# Patient Record
Sex: MALE | Race: WHITE | NOT HISPANIC OR LATINO | Employment: OTHER | URBAN - METROPOLITAN AREA
[De-identification: names, ages, dates, MRNs, and addresses within clinical notes are randomized per-mention and may not be internally consistent; named-entity substitution may affect disease eponyms.]

---

## 2019-03-25 DIAGNOSIS — I48.0 PAROXYSMAL ATRIAL FIBRILLATION: Primary | ICD-10-CM

## 2019-03-26 ENCOUNTER — OFFICE VISIT (OUTPATIENT)
Dept: CARDIOLOGY | Facility: CLINIC | Age: 49
End: 2019-03-26
Payer: MEDICARE

## 2019-03-26 ENCOUNTER — CLINICAL SUPPORT (OUTPATIENT)
Dept: CARDIOLOGY | Facility: CLINIC | Age: 49
End: 2019-03-26
Payer: MEDICARE

## 2019-03-26 VITALS
BODY MASS INDEX: 35.16 KG/M2 | DIASTOLIC BLOOD PRESSURE: 75 MMHG | HEART RATE: 75 BPM | WEIGHT: 251.13 LBS | SYSTOLIC BLOOD PRESSURE: 153 MMHG | HEIGHT: 71 IN

## 2019-03-26 DIAGNOSIS — I10 ESSENTIAL HYPERTENSION: Primary | ICD-10-CM

## 2019-03-26 DIAGNOSIS — R06.02 SOB (SHORTNESS OF BREATH): ICD-10-CM

## 2019-03-26 DIAGNOSIS — M79.605 CHRONIC PAIN OF BOTH LOWER EXTREMITIES: ICD-10-CM

## 2019-03-26 DIAGNOSIS — M79.604 CHRONIC PAIN OF BOTH LOWER EXTREMITIES: ICD-10-CM

## 2019-03-26 DIAGNOSIS — I48.0 PAROXYSMAL ATRIAL FIBRILLATION: ICD-10-CM

## 2019-03-26 DIAGNOSIS — F17.200 SMOKER: ICD-10-CM

## 2019-03-26 DIAGNOSIS — G89.29 CHRONIC PAIN OF BOTH LOWER EXTREMITIES: ICD-10-CM

## 2019-03-26 DIAGNOSIS — I10 ESSENTIAL HYPERTENSION: ICD-10-CM

## 2019-03-26 PROCEDURE — 93000 EKG 12-LEAD: ICD-10-PCS | Mod: S$GLB,,, | Performed by: INTERNAL MEDICINE

## 2019-03-26 PROCEDURE — 99999 PR PBB SHADOW E&M-EST. PATIENT-LVL III: ICD-10-PCS | Mod: PBBFAC,,, | Performed by: INTERNAL MEDICINE

## 2019-03-26 PROCEDURE — 3077F SYST BP >= 140 MM HG: CPT | Mod: CPTII,S$GLB,, | Performed by: INTERNAL MEDICINE

## 2019-03-26 PROCEDURE — 93000 ELECTROCARDIOGRAM COMPLETE: CPT | Mod: S$GLB,,, | Performed by: INTERNAL MEDICINE

## 2019-03-26 PROCEDURE — 99205 PR OFFICE/OUTPT VISIT, NEW, LEVL V, 60-74 MIN: ICD-10-PCS | Mod: S$GLB,,, | Performed by: INTERNAL MEDICINE

## 2019-03-26 PROCEDURE — 3008F BODY MASS INDEX DOCD: CPT | Mod: CPTII,S$GLB,, | Performed by: INTERNAL MEDICINE

## 2019-03-26 PROCEDURE — 3008F PR BODY MASS INDEX (BMI) DOCUMENTED: ICD-10-PCS | Mod: CPTII,S$GLB,, | Performed by: INTERNAL MEDICINE

## 2019-03-26 PROCEDURE — 3077F PR MOST RECENT SYSTOLIC BLOOD PRESSURE >= 140 MM HG: ICD-10-PCS | Mod: CPTII,S$GLB,, | Performed by: INTERNAL MEDICINE

## 2019-03-26 PROCEDURE — 3078F PR MOST RECENT DIASTOLIC BLOOD PRESSURE < 80 MM HG: ICD-10-PCS | Mod: CPTII,S$GLB,, | Performed by: INTERNAL MEDICINE

## 2019-03-26 PROCEDURE — 99999 PR PBB SHADOW E&M-EST. PATIENT-LVL III: CPT | Mod: PBBFAC,,, | Performed by: INTERNAL MEDICINE

## 2019-03-26 PROCEDURE — 3078F DIAST BP <80 MM HG: CPT | Mod: CPTII,S$GLB,, | Performed by: INTERNAL MEDICINE

## 2019-03-26 PROCEDURE — 99205 OFFICE O/P NEW HI 60 MIN: CPT | Mod: S$GLB,,, | Performed by: INTERNAL MEDICINE

## 2019-03-26 RX ORDER — ASPIRIN 81 MG/1
81 TABLET ORAL DAILY
COMMUNITY
Start: 2019-01-16 | End: 2020-06-12

## 2019-03-26 RX ORDER — MELOXICAM 15 MG/1
15 TABLET ORAL DAILY
COMMUNITY
End: 2022-07-14

## 2019-03-26 RX ORDER — LABETALOL 300 MG/1
300 TABLET, FILM COATED ORAL EVERY 12 HOURS
COMMUNITY
End: 2022-08-19 | Stop reason: ALTCHOICE

## 2019-03-26 RX ORDER — AMLODIPINE BESYLATE 10 MG/1
10 TABLET ORAL DAILY
COMMUNITY

## 2019-03-26 RX ORDER — SPIRONOLACTONE 25 MG/1
25 TABLET ORAL DAILY
COMMUNITY
End: 2022-07-14

## 2019-03-26 RX ORDER — GABAPENTIN 600 MG/1
600 TABLET ORAL 3 TIMES DAILY
COMMUNITY
End: 2022-07-14

## 2019-03-26 RX ORDER — FAMOTIDINE 40 MG/1
40 TABLET, FILM COATED ORAL DAILY
COMMUNITY

## 2019-03-26 NOTE — PROGRESS NOTES
Subjective:   Patient ID:  Carson Rivers is a 49 y.o. male who presents for follow-up of Consult  Pt with chronic SOB waxing and waning. Last stress test 2017.  EKG - NSR, LVH.  CRF- smoking, family hx of CAD,   Hypertension   This is a chronic problem. The current episode started more than 1 year ago. The problem has been gradually improving since onset. The problem is controlled. Associated symptoms include shortness of breath. Pertinent negatives include no chest pain or palpitations. Past treatments include beta blockers and calcium channel blockers. The current treatment provides moderate improvement. There are no compliance problems.    Shortness of Breath   This is a chronic problem. The current episode started more than 1 year ago. The problem occurs intermittently. The problem has been waxing and waning. Pertinent negatives include no chest pain or leg swelling. Nothing aggravates the symptoms. He has tried rest for the symptoms. The treatment provided moderate relief.       Review of Systems   Constitution: Negative. Negative for weight gain.   HENT: Negative.    Eyes: Negative.    Cardiovascular: Negative.  Negative for chest pain, leg swelling and palpitations.   Respiratory: Positive for shortness of breath.    Endocrine: Negative.    Hematologic/Lymphatic: Negative.    Skin: Negative.    Musculoskeletal: Negative for muscle weakness.   Gastrointestinal: Negative.    Genitourinary: Negative.    Neurological: Negative.  Negative for dizziness.   Psychiatric/Behavioral: Negative.    Allergic/Immunologic: Negative.      History reviewed. No pertinent family history.  History reviewed. No pertinent past medical history.  Social History     Socioeconomic History    Marital status:      Spouse name: Not on file    Number of children: Not on file    Years of education: Not on file    Highest education level: Not on file   Occupational History    Not on file   Social Needs    Financial resource  strain: Not on file    Food insecurity:     Worry: Not on file     Inability: Not on file    Transportation needs:     Medical: Not on file     Non-medical: Not on file   Tobacco Use    Smoking status: Not on file   Substance and Sexual Activity    Alcohol use: Not on file    Drug use: Not on file    Sexual activity: Not on file   Lifestyle    Physical activity:     Days per week: Not on file     Minutes per session: Not on file    Stress: Not on file   Relationships    Social connections:     Talks on phone: Not on file     Gets together: Not on file     Attends [employer] service: Not on file     Active member of club or organization: Not on file     Attends meetings of clubs or organizations: Not on file     Relationship status: Not on file    Intimate partner violence:     Fear of current or ex partner: Not on file     Emotionally abused: Not on file     Physically abused: Not on file     Forced sexual activity: Not on file   Other Topics Concern    Not on file   Social History Narrative    Not on file     Current Outpatient Medications on File Prior to Visit   Medication Sig Dispense Refill    amLODIPine (NORVASC) 10 MG tablet Take 10 mg by mouth once daily.      aspirin (ECOTRIN) 81 MG EC tablet Take 81 mg by mouth once daily.      famotidine (PEPCID) 40 MG tablet Take 40 mg by mouth once daily.      gabapentin (NEURONTIN) 600 MG tablet Take 600 mg by mouth 3 (three) times daily.      labetalol (NORMODYNE) 300 MG tablet Take 300 mg by mouth every 12 (twelve) hours.      meloxicam (MOBIC) 15 MG tablet Take 15 mg by mouth once daily.      spironolactone (ALDACTONE) 25 MG tablet Take 25 mg by mouth once daily.       No current facility-administered medications on file prior to visit.      Review of patient's allergies indicates:  No Known Allergies    Objective:     Physical Exam   Constitutional: He is oriented to person, place, and time. He appears well-developed and well-nourished.   HENT:    Head: Normocephalic and atraumatic.   Eyes: Pupils are equal, round, and reactive to light. Conjunctivae are normal.   Neck: Normal range of motion. Neck supple.   Cardiovascular: Normal rate, regular rhythm, normal heart sounds and intact distal pulses.   Pulmonary/Chest: Effort normal and breath sounds normal.   Abdominal: Soft. Bowel sounds are normal.   Neurological: He is alert and oriented to person, place, and time.   Skin: Skin is warm and dry.   Psychiatric: He has a normal mood and affect.   Nursing note and vitals reviewed.      Assessment:     1. Essential hypertension    2. SOB (shortness of breath)    3. Chronic pain of both lower extremities    4. smoke    Plan:     Essential hypertension    SOB (shortness of breath)    Chronic pain of both lower extremities      Continue norvasc, labetolol, -htn  Stress test and echo  Check lipids

## 2019-03-29 ENCOUNTER — TELEPHONE (OUTPATIENT)
Dept: CARDIOLOGY | Facility: CLINIC | Age: 49
End: 2019-03-29

## 2019-03-29 NOTE — TELEPHONE ENCOUNTER
Called and left a v/m to call back if he has any questions regarding his nuclear stress test next week. amy

## 2019-04-02 ENCOUNTER — LAB VISIT (OUTPATIENT)
Dept: LAB | Facility: HOSPITAL | Age: 49
End: 2019-04-02
Attending: INTERNAL MEDICINE
Payer: MEDICARE

## 2019-04-02 ENCOUNTER — CLINICAL SUPPORT (OUTPATIENT)
Dept: CARDIOLOGY | Facility: CLINIC | Age: 49
End: 2019-04-02
Attending: INTERNAL MEDICINE
Payer: MEDICARE

## 2019-04-02 DIAGNOSIS — R06.02 SOB (SHORTNESS OF BREATH): ICD-10-CM

## 2019-04-02 DIAGNOSIS — I10 ESSENTIAL HYPERTENSION: ICD-10-CM

## 2019-04-02 LAB
ALBUMIN SERPL BCP-MCNC: 4.1 G/DL (ref 3.5–5.2)
ALP SERPL-CCNC: 99 U/L (ref 55–135)
ALT SERPL W/O P-5'-P-CCNC: 40 U/L (ref 10–44)
AST SERPL-CCNC: 33 U/L (ref 10–40)
BILIRUB DIRECT SERPL-MCNC: 0.2 MG/DL (ref 0.1–0.3)
BILIRUB SERPL-MCNC: 0.4 MG/DL (ref 0.1–1)
CHOLEST SERPL-MCNC: 167 MG/DL (ref 120–199)
CHOLEST/HDLC SERPL: 6.7 {RATIO} (ref 2–5)
DIASTOLIC DYSFUNCTION: NO
ESTIMATED PA SYSTOLIC PRESSURE: 29.01
HDLC SERPL-MCNC: 25 MG/DL (ref 40–75)
HDLC SERPL: 15 % (ref 20–50)
LDLC SERPL CALC-MCNC: 100.8 MG/DL (ref 63–159)
NONHDLC SERPL-MCNC: 142 MG/DL
PROT SERPL-MCNC: 7.5 G/DL (ref 6–8.4)
RETIRED EF AND QEF - SEE NOTES: 60 (ref 55–65)
TRICUSPID VALVE REGURGITATION: NORMAL
TRIGL SERPL-MCNC: 206 MG/DL (ref 30–150)

## 2019-04-02 PROCEDURE — 93306 TTE W/DOPPLER COMPLETE: CPT | Mod: S$GLB,,, | Performed by: INTERNAL MEDICINE

## 2019-04-02 PROCEDURE — 80076 HEPATIC FUNCTION PANEL: CPT

## 2019-04-02 PROCEDURE — 36415 COLL VENOUS BLD VENIPUNCTURE: CPT | Mod: PO

## 2019-04-02 PROCEDURE — 93306 2D ECHO WITH COLOR FLOW DOPPLER: ICD-10-PCS | Mod: S$GLB,,, | Performed by: INTERNAL MEDICINE

## 2019-04-02 PROCEDURE — 80061 LIPID PANEL: CPT

## 2019-04-03 ENCOUNTER — TELEPHONE (OUTPATIENT)
Dept: CARDIOLOGY | Facility: CLINIC | Age: 49
End: 2019-04-03

## 2019-04-03 DIAGNOSIS — R06.02 SOB (SHORTNESS OF BREATH): Primary | ICD-10-CM

## 2019-04-03 DIAGNOSIS — I10 ESSENTIAL HYPERTENSION: ICD-10-CM

## 2019-04-03 NOTE — TELEPHONE ENCOUNTER
04/03 Called pt to review both echo and labs. No answer. No way to leave a message. Cm  ----- Message from Carroll Antunez MD sent at 4/3/2019  4:51 AM CDT -----  Please tell pr echo wnl

## 2019-04-11 ENCOUNTER — TELEPHONE (OUTPATIENT)
Dept: CARDIOLOGY | Facility: CLINIC | Age: 49
End: 2019-04-11

## 2019-04-11 DIAGNOSIS — R06.02 SOB (SHORTNESS OF BREATH): ICD-10-CM

## 2019-04-11 DIAGNOSIS — I10 ESSENTIAL HYPERTENSION: Primary | ICD-10-CM

## 2019-04-11 NOTE — TELEPHONE ENCOUNTER
Unable to reach patient at number listed to reschedule nuclear stress.Left message with other phone number for family to have patient contact office regarding rescheduling appointment for stress nuclear.

## 2019-04-11 NOTE — TELEPHONE ENCOUNTER
The patient had a nuclear stress test scheduled and he no showed but when contacted explained he could not talk to anyone when he tried to call and cancel.He wants to reschedule but could not do stress on 4/3 when previously scheduled.I explained to patient we can schedule here in [place] or [place].He wishes to have test in [place].I will contact patient when test is rescheduled and he agreed.

## 2019-04-11 NOTE — TELEPHONE ENCOUNTER
Called dannie back.He wanted to change appointment back to 4/17 for stress test.Done and mailed information .

## 2019-04-16 ENCOUNTER — TELEPHONE (OUTPATIENT)
Dept: CARDIOLOGY | Facility: CLINIC | Age: 49
End: 2019-04-16

## 2019-04-17 ENCOUNTER — CLINICAL SUPPORT (OUTPATIENT)
Dept: CARDIOLOGY | Facility: CLINIC | Age: 49
End: 2019-04-17
Attending: INTERNAL MEDICINE
Payer: MEDICARE

## 2019-04-17 DIAGNOSIS — I10 ESSENTIAL HYPERTENSION: ICD-10-CM

## 2019-04-17 DIAGNOSIS — R06.02 SOB (SHORTNESS OF BREATH): ICD-10-CM

## 2019-04-17 PROCEDURE — A9500 TC99M SESTAMIBI: HCPCS | Mod: S$GLB,,, | Performed by: INTERNAL MEDICINE

## 2019-04-17 PROCEDURE — 93015 CV STRESS TEST SUPVJ I&R: CPT | Mod: S$GLB,,, | Performed by: INTERNAL MEDICINE

## 2019-04-17 PROCEDURE — A9500 NM MULTI TREAD STRESS CARDIAC COMPONENT: ICD-10-PCS | Mod: S$GLB,,, | Performed by: INTERNAL MEDICINE

## 2019-04-17 PROCEDURE — 78452 HT MUSCLE IMAGE SPECT MULT: CPT | Mod: S$GLB,,, | Performed by: INTERNAL MEDICINE

## 2019-04-17 PROCEDURE — 78452 NM MULTI TREAD STRESS CARDIAC COMPONENT: ICD-10-PCS | Mod: S$GLB,,, | Performed by: INTERNAL MEDICINE

## 2019-04-17 PROCEDURE — 93015 NM MULTI TREAD STRESS CARDIAC COMPONENT: ICD-10-PCS | Mod: S$GLB,,, | Performed by: INTERNAL MEDICINE

## 2019-04-24 ENCOUNTER — TELEPHONE (OUTPATIENT)
Dept: CARDIOLOGY | Facility: CLINIC | Age: 49
End: 2019-04-24

## 2019-04-24 LAB — DIASTOLIC DYSFUNCTION: NO

## 2019-04-24 NOTE — TELEPHONE ENCOUNTER
I spoke with Kristine at Dr Oconnell office. The patient is needing preop clearance.I contacted the patient and let him know he will need to see Provider for surgery clearance.He is to do Preop at Dr Oconnell office tomorrow then come in Friday to see Shyann Alas NP.Fax clearance to [phone].

## 2019-04-24 NOTE — TELEPHONE ENCOUNTER
The patient recently had a nuclear stress test to clear him for orthopedic surgery with Dr Estrada at [phone]. He needs surgery clearance status faxed to Dr Estrada at [phone]. Message sent to Shyann Alas NP.

## 2019-04-26 ENCOUNTER — OFFICE VISIT (OUTPATIENT)
Dept: CARDIOLOGY | Facility: CLINIC | Age: 49
End: 2019-04-26
Payer: MEDICARE

## 2019-04-26 VITALS
SYSTOLIC BLOOD PRESSURE: 134 MMHG | BODY MASS INDEX: 34.6 KG/M2 | WEIGHT: 247.13 LBS | HEIGHT: 71 IN | DIASTOLIC BLOOD PRESSURE: 63 MMHG | HEART RATE: 93 BPM

## 2019-04-26 DIAGNOSIS — Z01.810 PREOP CARDIOVASCULAR EXAM: Primary | ICD-10-CM

## 2019-04-26 DIAGNOSIS — E66.09 CLASS 1 OBESITY DUE TO EXCESS CALORIES WITH SERIOUS COMORBIDITY AND BODY MASS INDEX (BMI) OF 34.0 TO 34.9 IN ADULT: ICD-10-CM

## 2019-04-26 DIAGNOSIS — Z82.49 FAMILY HISTORY OF CORONARY ARTERY DISEASE: ICD-10-CM

## 2019-04-26 DIAGNOSIS — I10 ESSENTIAL HYPERTENSION: ICD-10-CM

## 2019-04-26 DIAGNOSIS — E88.810 METABOLIC SYNDROME: ICD-10-CM

## 2019-04-26 DIAGNOSIS — Z71.3 DIETARY COUNSELING: ICD-10-CM

## 2019-04-26 DIAGNOSIS — F17.200 SMOKER: ICD-10-CM

## 2019-04-26 PROCEDURE — 99214 PR OFFICE/OUTPT VISIT, EST, LEVL IV, 30-39 MIN: ICD-10-PCS | Mod: S$GLB,,, | Performed by: NURSE PRACTITIONER

## 2019-04-26 PROCEDURE — 99214 OFFICE O/P EST MOD 30 MIN: CPT | Mod: S$GLB,,, | Performed by: NURSE PRACTITIONER

## 2019-04-26 PROCEDURE — 3008F PR BODY MASS INDEX (BMI) DOCUMENTED: ICD-10-PCS | Mod: CPTII,S$GLB,, | Performed by: NURSE PRACTITIONER

## 2019-04-26 PROCEDURE — 3075F PR MOST RECENT SYSTOLIC BLOOD PRESS GE 130-139MM HG: ICD-10-PCS | Mod: CPTII,S$GLB,, | Performed by: NURSE PRACTITIONER

## 2019-04-26 PROCEDURE — 3078F DIAST BP <80 MM HG: CPT | Mod: CPTII,S$GLB,, | Performed by: NURSE PRACTITIONER

## 2019-04-26 PROCEDURE — 99999 PR PBB SHADOW E&M-EST. PATIENT-LVL III: ICD-10-PCS | Mod: PBBFAC,,, | Performed by: NURSE PRACTITIONER

## 2019-04-26 PROCEDURE — 3078F PR MOST RECENT DIASTOLIC BLOOD PRESSURE < 80 MM HG: ICD-10-PCS | Mod: CPTII,S$GLB,, | Performed by: NURSE PRACTITIONER

## 2019-04-26 PROCEDURE — 3075F SYST BP GE 130 - 139MM HG: CPT | Mod: CPTII,S$GLB,, | Performed by: NURSE PRACTITIONER

## 2019-04-26 PROCEDURE — 99999 PR PBB SHADOW E&M-EST. PATIENT-LVL III: CPT | Mod: PBBFAC,,, | Performed by: NURSE PRACTITIONER

## 2019-04-26 PROCEDURE — 3008F BODY MASS INDEX DOCD: CPT | Mod: CPTII,S$GLB,, | Performed by: NURSE PRACTITIONER

## 2019-04-26 NOTE — PROGRESS NOTES
Subjective:    Patient ID:  Carson Rivers is a 49 y.o. male who presents for evaluation of surgery clearance      HPI: Mr. Carson Rivers presents to the clinic for preop CV exam. he stated that he needs shoulder surgery. He had a pharm NM stress test that is negative for ischemia; echo with normal EF and diastolic function.EKG done March 26, 2019: NSR; LVH with QRS widening. He denied any chest pain or SOB. He denied PRYOR with walking.    Medications: he is not missing any doses.  Sodium: he does add salt to foods,  he is not reading labels for sodium content.   Dietary Fats: Not eating fried foods; prefers starchy vegetables  Dietary Sugars: Dr. Pepper throughout the day-as much as 2 liters/day.  Exercise: he does not; he is limited by arthritis pain.  Tobacco: currently smoking 10 cigarettes/day which is decreased from 30/day. He is currently taking chantix to help him quit.   Alcohol: no alcohol use     Weight: 112.1 kg (247 lb 2.2 oz) he states that his daily weights has been stable Body mass index is 34.47 kg/m².  Wt Readings from Last 3 Encounters:   04/26/19 112.1 kg (247 lb 2.2 oz)   03/26/19 113.9 kg (251 lb 1.7 oz)     BP log: none    Review of Systems   Constitution: Negative for chills, decreased appetite, fever, night sweats, weight gain and weight loss.   HENT: Negative for congestion.    Cardiovascular: Negative for chest pain, claudication, cyanosis, dyspnea on exertion, irregular heartbeat, leg swelling, near-syncope, orthopnea, palpitations, paroxysmal nocturnal dyspnea and syncope.   Respiratory: Negative for cough, hemoptysis, shortness of breath, sputum production and wheezing.    Hematologic/Lymphatic: Negative for adenopathy and bleeding problem. Does not bruise/bleed easily.   Skin: Negative for color change and nail changes.   Musculoskeletal: Positive for arthritis (multiple joints and his back.).   Gastrointestinal: Negative for bloating, abdominal pain, change in bowel habit, heartburn,  hematochezia, melena, nausea and vomiting.   Genitourinary: Negative for hematuria.   Neurological: Negative for dizziness and light-headedness.   Psychiatric/Behavioral: Negative for altered mental status.       Objective:   Physical Exam   Constitutional: He is oriented to person, place, and time. He appears well-developed and well-nourished. No distress.   HENT:   Head: Normocephalic and atraumatic.   Eyes: Conjunctivae are normal. No scleral icterus.   Neck: Neck supple. No JVD present. No tracheal deviation present. No thyromegaly present.   Cardiovascular: Normal rate, regular rhythm, normal heart sounds and intact distal pulses. Exam reveals no gallop and no friction rub.   No murmur heard.  Pulmonary/Chest: Effort normal and breath sounds normal. No respiratory distress. He has no wheezes. He has no rales. He exhibits no tenderness.   Abdominal: Soft. Bowel sounds are normal. He exhibits no distension and no mass. There is no tenderness. There is no rebound and no guarding.   Musculoskeletal: Normal range of motion. He exhibits no edema.   Lymphadenopathy:     He has no cervical adenopathy.   Neurological: He is alert and oriented to person, place, and time.   Skin: Skin is warm and dry. No rash noted. He is not diaphoretic. No erythema. No pallor.   Pink   Psychiatric: He has a normal mood and affect.      Results for JODY PATEL (MRN [id number]) as of 4/26/2019 08:20   Ref. Range 4/2/2019 08:40   Alkaline Phosphatase Latest Ref Range: 55 - 135 U/L 99   PROTEIN TOTAL Latest Ref Range: 6.0 - 8.4 g/dL 7.5   Albumin Latest Ref Range: 3.5 - 5.2 g/dL 4.1   BILIRUBIN TOTAL Latest Ref Range: 0.1 - 1.0 mg/dL 0.4   Bilirubin, Direct Latest Ref Range: 0.1 - 0.3 mg/dL 0.2   AST Latest Ref Range: 10 - 40 U/L 33   ALT Latest Ref Range: 10 - 44 U/L 40   Triglycerides Latest Ref Range: 30 - 150 mg/dL 206 (H)   Cholesterol Latest Ref Range: 120 - 199 mg/dL 167   HDL Latest Ref Range: 40 - 75 mg/dL 25 (L)   HDL/Chol Ratio  Latest Ref Range: 20.0 - 50.0 % 15.0 (L)   LDL Cholesterol Latest Ref Range: 63.0 - 159.0 mg/dL 100.8   Non-HDL Cholesterol Latest Units: mg/dL 142   Total Cholesterol/HDL Ratio Latest Ref Range: 2.0 - 5.0  6.7 (H)     Pharm NM Stress test 4/17/19: Impression: NORMAL MYOCARDIAL PERFUSION  1. The perfusion scan is free of evidence for myocardial ischemia or injury.   2. Resting wall motion is physiologic.   3. Resting LV function is normal.   4. The ventricular volumes are normal at rest and stress.   5. The extracardiac distribution of radioactivity is normal.   6. There was no previous study available to compare.    Echo 4/2/19: CONCLUSIONS     1 - Normal left ventricular systolic function (EF 60-65%).     2 - Normal left ventricular diastolic function.     3 - Normal right ventricular systolic function .     4 - Concentric hypertrophy.     Assessment:      1. Preop cardiovascular exam    2. Essential hypertension    3. Smoker    4. Family history of coronary artery disease    5. Class 1 obesity due to excess calories with serious comorbidity and body mass index (BMI) of 34.0 to 34.9 in adult    6. Metabolic syndrome    7. Dietary counseling        Plan:     Preop cardiovascular exam    Essential hypertension    Smoker    Family history of coronary artery disease    Class 1 obesity due to excess calories with serious comorbidity and body mass index (BMI) of 34.0 to 34.9 in adult    Metabolic syndrome    Dietary counseling       Continue labetalol, spironolactone, and amlodipine as directed- HTN  Monitor BP at home.  Mr. Rivers is in stable cardiac condition for shoulder surgery. Note to Dr. Estrada.  May hold ASA for procedure and resume afterward.  Reviewed test results with Mr. Rivers-stress test, echo, and FLP, LFTs. Discussed how to improve triglycerides and HDL through diet and exercise.  Encouraged reduction in processed foods, white starches, and increase non-starchy vegetables. Reduce/eliminated  sweetened beverages. Discussed making small, progressive changes in lifestyle.  Encouraged exercise-even chair exercises and walking as much as possible.  Sodium restriction encouraged.  Encouraged weight loss.  Follow up with Dr. Antunez on June 26, 2019 as planned or call sooner for any problems.

## 2019-06-26 ENCOUNTER — OFFICE VISIT (OUTPATIENT)
Dept: CARDIOLOGY | Facility: CLINIC | Age: 49
End: 2019-06-26
Payer: MEDICARE

## 2019-06-26 VITALS
SYSTOLIC BLOOD PRESSURE: 138 MMHG | WEIGHT: 242.63 LBS | BODY MASS INDEX: 33.97 KG/M2 | HEART RATE: 82 BPM | DIASTOLIC BLOOD PRESSURE: 78 MMHG | HEIGHT: 71 IN

## 2019-06-26 DIAGNOSIS — F17.200 SMOKER: ICD-10-CM

## 2019-06-26 DIAGNOSIS — R06.02 SOB (SHORTNESS OF BREATH): ICD-10-CM

## 2019-06-26 DIAGNOSIS — E66.09 CLASS 1 OBESITY DUE TO EXCESS CALORIES WITH SERIOUS COMORBIDITY AND BODY MASS INDEX (BMI) OF 34.0 TO 34.9 IN ADULT: ICD-10-CM

## 2019-06-26 DIAGNOSIS — I10 ESSENTIAL HYPERTENSION: Primary | ICD-10-CM

## 2019-06-26 DIAGNOSIS — Z82.49 FAMILY HISTORY OF CORONARY ARTERY DISEASE: ICD-10-CM

## 2019-06-26 PROCEDURE — 99214 PR OFFICE/OUTPT VISIT, EST, LEVL IV, 30-39 MIN: ICD-10-PCS | Mod: S$GLB,,, | Performed by: INTERNAL MEDICINE

## 2019-06-26 PROCEDURE — 99999 PR PBB SHADOW E&M-EST. PATIENT-LVL III: ICD-10-PCS | Mod: PBBFAC,,, | Performed by: INTERNAL MEDICINE

## 2019-06-26 PROCEDURE — 99999 PR PBB SHADOW E&M-EST. PATIENT-LVL III: CPT | Mod: PBBFAC,,, | Performed by: INTERNAL MEDICINE

## 2019-06-26 PROCEDURE — 3075F PR MOST RECENT SYSTOLIC BLOOD PRESS GE 130-139MM HG: ICD-10-PCS | Mod: CPTII,S$GLB,, | Performed by: INTERNAL MEDICINE

## 2019-06-26 PROCEDURE — 3008F BODY MASS INDEX DOCD: CPT | Mod: CPTII,S$GLB,, | Performed by: INTERNAL MEDICINE

## 2019-06-26 PROCEDURE — 3078F PR MOST RECENT DIASTOLIC BLOOD PRESSURE < 80 MM HG: ICD-10-PCS | Mod: CPTII,S$GLB,, | Performed by: INTERNAL MEDICINE

## 2019-06-26 PROCEDURE — 3008F PR BODY MASS INDEX (BMI) DOCUMENTED: ICD-10-PCS | Mod: CPTII,S$GLB,, | Performed by: INTERNAL MEDICINE

## 2019-06-26 PROCEDURE — 3078F DIAST BP <80 MM HG: CPT | Mod: CPTII,S$GLB,, | Performed by: INTERNAL MEDICINE

## 2019-06-26 PROCEDURE — 3075F SYST BP GE 130 - 139MM HG: CPT | Mod: CPTII,S$GLB,, | Performed by: INTERNAL MEDICINE

## 2019-06-26 PROCEDURE — 99214 OFFICE O/P EST MOD 30 MIN: CPT | Mod: S$GLB,,, | Performed by: INTERNAL MEDICINE

## 2019-06-26 NOTE — PROGRESS NOTES
Subjective:   Patient ID:  Carson Rivers is a 49 y.o. male who presents for follow-up of Follow-up  Patient denies CP, angina or anginal equivalent.NMT (-). Pt's SOB improved with weight loss.  BP at goal at home  Hypertension   This is a chronic problem. The current episode started more than 1 year ago. The problem has been gradually improving since onset. The problem is controlled. Pertinent negatives include no chest pain, palpitations or shortness of breath. Past treatments include calcium channel blockers, beta blockers and diuretics. The current treatment provides moderate improvement. There are no compliance problems.        Review of Systems   Constitution: Negative. Negative for weight gain.   HENT: Negative.    Eyes: Negative.    Cardiovascular: Negative.  Negative for chest pain, leg swelling and palpitations.   Respiratory: Negative.  Negative for shortness of breath.    Endocrine: Negative.    Hematologic/Lymphatic: Negative.    Skin: Negative.    Musculoskeletal: Negative for muscle weakness.   Gastrointestinal: Negative.    Genitourinary: Negative.    Neurological: Negative.  Negative for dizziness.   Psychiatric/Behavioral: Negative.    Allergic/Immunologic: Negative.      History reviewed. No pertinent family history.  Past Medical History:   Diagnosis Date    Hyperlipidemia     Hypertension      Social History     Socioeconomic History    Marital status:      Spouse name: Not on file    Number of children: Not on file    Years of education: Not on file    Highest education level: Not on file   Occupational History    Not on file   Social Needs    Financial resource strain: Not on file    Food insecurity:     Worry: Not on file     Inability: Not on file    Transportation needs:     Medical: Not on file     Non-medical: Not on file   Tobacco Use    Smoking status: Current Every Day Smoker     Packs/day: 0.50    Smokeless tobacco: Never Used   Substance and Sexual Activity     Alcohol use: Not on file     Comment: None    Drug use: Not on file     Comment: H/O drug abuse; none now.    Sexual activity: Yes   Lifestyle    Physical activity:     Days per week: Not on file     Minutes per session: Not on file    Stress: Not on file   Relationships    Social connections:     Talks on phone: Not on file     Gets together: Not on file     Attends [employer] service: Not on file     Active member of club or organization: Not on file     Attends meetings of clubs or organizations: Not on file     Relationship status: Not on file   Other Topics Concern    Not on file   Social History Narrative    Not on file     Current Outpatient Medications on File Prior to Visit   Medication Sig Dispense Refill    amLODIPine (NORVASC) 10 MG tablet Take 10 mg by mouth once daily.      aspirin (ECOTRIN) 81 MG EC tablet Take 81 mg by mouth once daily.      famotidine (PEPCID) 40 MG tablet Take 40 mg by mouth once daily.      gabapentin (NEURONTIN) 600 MG tablet Take 600 mg by mouth 3 (three) times daily.      labetalol (NORMODYNE) 300 MG tablet Take 300 mg by mouth every 12 (twelve) hours.      meloxicam (MOBIC) 15 MG tablet Take 15 mg by mouth once daily.      spironolactone (ALDACTONE) 25 MG tablet Take 25 mg by mouth once daily.       No current facility-administered medications on file prior to visit.      Review of patient's allergies indicates:  No Known Allergies    Objective:     Physical Exam   Constitutional: He is oriented to person, place, and time. He appears well-developed and well-nourished.   HENT:   Head: Normocephalic and atraumatic.   Eyes: Pupils are equal, round, and reactive to light. Conjunctivae are normal.   Neck: Normal range of motion. Neck supple.   Cardiovascular: Normal rate, regular rhythm, normal heart sounds and intact distal pulses.   Pulmonary/Chest: Effort normal and breath sounds normal.   Abdominal: Soft. Bowel sounds are normal.   Neurological: He is alert and  oriented to person, place, and time.   Skin: Skin is warm and dry.   Psychiatric: He has a normal mood and affect.   Nursing note and vitals reviewed.      Assessment:     1. Essential hypertension    2. Class 1 obesity due to excess calories with serious comorbidity and body mass index (BMI) of 34.0 to 34.9 in adult    3. SOB (shortness of breath)    4. Smoker    5. Family history of coronary artery disease        Plan:     Essential hypertension    Class 1 obesity due to excess calories with serious comorbidity and body mass index (BMI) of 34.0 to 34.9 in adult    SOB (shortness of breath)    Smoker    Family history of coronary artery disease      Continue norvasc, labetolol, aldactone-htn  Smoking cessation

## 2019-10-16 ENCOUNTER — CLINICAL SUPPORT (OUTPATIENT)
Dept: REHABILITATION | Facility: HOSPITAL | Age: 49
End: 2019-10-16
Payer: MEDICARE

## 2019-10-16 DIAGNOSIS — M79.18 MYOFASCIAL MUSCLE PAIN: ICD-10-CM

## 2019-10-16 DIAGNOSIS — M54.16 LUMBAR RADICULOPATHY: ICD-10-CM

## 2019-10-16 DIAGNOSIS — R53.1 DECREASED STRENGTH: ICD-10-CM

## 2019-10-16 PROCEDURE — 97140 MANUAL THERAPY 1/> REGIONS: CPT | Mod: PN

## 2019-10-16 PROCEDURE — 97110 THERAPEUTIC EXERCISES: CPT | Mod: PN

## 2019-10-16 PROCEDURE — 97530 THERAPEUTIC ACTIVITIES: CPT | Mod: PN,59

## 2019-10-16 PROCEDURE — 97163 PT EVAL HIGH COMPLEX 45 MIN: CPT | Mod: PN

## 2019-10-22 ENCOUNTER — CLINICAL SUPPORT (OUTPATIENT)
Dept: REHABILITATION | Facility: HOSPITAL | Age: 49
End: 2019-10-22
Payer: MEDICARE

## 2019-10-22 DIAGNOSIS — M54.16 LUMBAR RADICULOPATHY: ICD-10-CM

## 2019-10-22 DIAGNOSIS — M79.18 MYOFASCIAL MUSCLE PAIN: ICD-10-CM

## 2019-10-22 DIAGNOSIS — R53.1 DECREASED STRENGTH: ICD-10-CM

## 2019-10-22 PROCEDURE — 97110 THERAPEUTIC EXERCISES: CPT | Mod: PN

## 2019-10-22 PROCEDURE — 97140 MANUAL THERAPY 1/> REGIONS: CPT | Mod: PN

## 2019-10-22 PROCEDURE — 97530 THERAPEUTIC ACTIVITIES: CPT | Mod: PN,59
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
## 2019-10-24 ENCOUNTER — CLINICAL SUPPORT (OUTPATIENT)
Dept: REHABILITATION | Facility: HOSPITAL | Age: 49
End: 2019-10-24
Payer: MEDICARE

## 2019-10-24 DIAGNOSIS — M54.16 LUMBAR RADICULOPATHY: ICD-10-CM

## 2019-10-24 DIAGNOSIS — M79.18 MYOFASCIAL MUSCLE PAIN: ICD-10-CM

## 2019-10-24 DIAGNOSIS — R53.1 DECREASED STRENGTH: ICD-10-CM

## 2019-10-24 PROCEDURE — 97140 MANUAL THERAPY 1/> REGIONS: CPT | Mod: PN

## 2019-10-24 PROCEDURE — 97110 THERAPEUTIC EXERCISES: CPT | Mod: PN

## 2019-10-24 PROCEDURE — 97014 ELECTRIC STIMULATION THERAPY: CPT | Mod: PN

## 2019-10-24 NOTE — PROGRESS NOTES
"  Physical Therapy Daily Treatment Note     Name: Carson Rivers  Clinic Number: [id number]    Therapy Diagnosis:   Encounter Diagnoses   Name Primary?    Lumbar radiculopathy     Decreased strength     Myofascial muscle pain      Physician: Referring, Unknown    Visit Date: 10/24/2019    Physician Orders: PT eval and treat  Medical Diagnosis: Left foot drop [M21.372]  Evaluation Date: 10/16/2019  Authorization Period Expiration: 12/31/2019  Plan of Care Certification Period: 12/4/2019  Visit #/Visits authorized: 3 / 20     Time In: 8:05  Time Out: 9:00  Total Billable Time: 55 minutes    Precautions: Fall    Subjective     Pt reports: continued pain with sleeping, states "it was a bad night". No symptoms currently down the leg but reports foot pain bilaterally, worse in the R foot. Did not do his exercises yesterday, but plans to do them twice over the weekend. States hunting season starts in 3-4 weeks and he is "determined to hunt this year"  He was compliant with home exercise program.  Response to previous treatment: pain to L buttocks and back in aching nature post previous treatment which improved the next day  Functional change: none yet.     Pain: 4/10 pre treatment; 4-5/10 post treatment  Location: bilateral lower back but greater on R side      Objective     Carson received therapeutic exercises to develop strength, endurance, ROM, flexibility, posture and core stabilization for 35 minutes including:    Glut sets supine in hook lying 10 x 5"  B hip abd in hook lying RTB 2 x 10  Bridge with RTB around knees for hip abd nicky x 10 - very small ROM  nerve glides  seated gastro and soleus stretch 2x20 sec each    Carson received the following manual therapy techniques: Soft tissue Mobilization were applied to the: lumbar paraspinals for 10 minutes, including:  STM to the lumbar paraspinals with patient in R sidelying    Carson received the following supervised modalities after being cleared for contradictions: IFC " electrical stimulation for pain to the lumbar paraspinals for 10 minutes. Carson tolerated treatment well without any adverse effects.   Carson received hot pack 10 minutes to the low back during IFC e-stim for improved circulation and decreased pain.      Home Exercises Provided and Patient Education Provided     Education provided:   - drink less caffeine and increase water intake  - listen to the body, avoiding activities that worsen symptoms  - perform stretches daily within pain free range, even if muscles are sore    Written Home Exercises Provided: Patient instructed to cont prior HEP.  Exercises were reviewed and Carson was able to demonstrate them prior to the end of the session.  Carson demonstrated good  understanding of the education provided.     See EMR under Patient Instructions for exercises provided 10/16/19 and 10/22/19.    Assessment     Carson tolerated treatment fairly well this date although occasionally required position changes due to onset of lumbar discomfort. Remains unable to lay supine without knees bent, however able to perform exercises in hook lying for short periods of time. Manual techniques performed with patient in R sidelying due to patient implying it was painful to lay prone. Mild improvement of soft tissue quality along the lumbar paraspinals yet remains more tender and tight along upper lumbar region. No reports of increased symptoms into the legs post treatment.  Carson is progressing fairly well towards his goals.   Pt prognosis is Fair.     Pt will continue to benefit from skilled outpatient physical therapy to address the deficits listed in the problem list box on initial evaluation, provide pt/family education and to maximize pt's level of independence in the home and community environment.     Pt's spiritual, cultural and educational needs considered and pt agreeable to plan of care and goals.     Anticipated barriers to physical therapy: transportation, distance from  clinic    Goals:   Short Term Goals: 4 weeks   1. Pt will report decreased pain at worst to 8/10 in order to improve ADLs.   2. Pt will presnet with increased lumbar ROM into side bending by 10% for improved mobility with ADLs  3. Pt will present with increased hip strength into abd by one half grade for increased stability to lumbar spine during ambulation.   4. Pt will present with increased ankle DF by 2 degrees for increased ease with ambulation  5. Pt will present with increased hip strength into ext by one half grade for increased ease with sit to stand and increased endurance with standing.   6. Pt will present with increased single leg balance by 2 seconds for increased stability with ambulation.      Long Term Goals: 8 weeks   1. Pt will report decreased pain at worst to 6/10 in order to improve ADLs.   2. Pt will presnet with increased lumbar ROM into side bending by 20% for improved mobility with ADLs  3. Pt will present with increased hip strength into abd by one full grade for increased stability to lumbar spine during ambulation.   4. Pt will present with increased ankle DF by 4 degrees for increased ease with ambulation  5. Pt will present with increased hip strength into ext by one full grade for increased ease with sit to stand and increased endurance with standing.   6. Pt will present with increased single leg balance by 4 seconds for increased stability with ambulation.  7. Pt will be independent with HEP upon discharge.    Plan     Possible IASTM with tool next session    Mariel Brody PTA
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
## 2019-10-29 ENCOUNTER — CLINICAL SUPPORT (OUTPATIENT)
Dept: REHABILITATION | Facility: HOSPITAL | Age: 49
End: 2019-10-29
Payer: MEDICARE

## 2019-10-29 DIAGNOSIS — R53.1 DECREASED STRENGTH: ICD-10-CM

## 2019-10-29 DIAGNOSIS — M79.18 MYOFASCIAL MUSCLE PAIN: ICD-10-CM

## 2019-10-29 DIAGNOSIS — M54.16 LUMBAR RADICULOPATHY: ICD-10-CM

## 2019-10-29 PROCEDURE — 97014 ELECTRIC STIMULATION THERAPY: CPT | Mod: PN

## 2019-10-29 PROCEDURE — 97110 THERAPEUTIC EXERCISES: CPT | Mod: PN

## 2019-10-29 PROCEDURE — 97140 MANUAL THERAPY 1/> REGIONS: CPT | Mod: PN
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
## 2019-10-31 ENCOUNTER — CLINICAL SUPPORT (OUTPATIENT)
Dept: REHABILITATION | Facility: HOSPITAL | Age: 49
End: 2019-10-31
Payer: MEDICARE

## 2019-10-31 DIAGNOSIS — M54.16 LUMBAR RADICULOPATHY: ICD-10-CM

## 2019-10-31 DIAGNOSIS — M79.18 MYOFASCIAL MUSCLE PAIN: ICD-10-CM

## 2019-10-31 DIAGNOSIS — R53.1 DECREASED STRENGTH: ICD-10-CM

## 2019-10-31 PROCEDURE — 97014 ELECTRIC STIMULATION THERAPY: CPT | Mod: PN

## 2019-10-31 PROCEDURE — 97140 MANUAL THERAPY 1/> REGIONS: CPT | Mod: PN

## 2019-10-31 NOTE — PROGRESS NOTES
"  Physical Therapy Daily Treatment Note     Name: Carson Rivers  Clinic Number: [id number]    Therapy Diagnosis:   Encounter Diagnoses   Name Primary?    Lumbar radiculopathy     Decreased strength     Myofascial muscle pain      Physician: Referring, Unknown    Visit Date: 10/31/2019    Physician Orders: PT eval and treat  Medical Diagnosis: Left foot drop [M21.372]  Evaluation Date: 10/16/2019  Authorization Period Expiration: 12/31/2019  Plan of Care Certification Period: 12/4/2019  Visit #/Visits authorized: 4 / 20     Time In: 11:08  Time Out: 12:15  Total Billable Time: 55 minutes    Precautions: Fall    Subjective     Pt reports: he is in a lot of pain today and not sure why. He did not sleep well last night and was in a lot of pain. He requested to hold off on exercises due to increased pain.    He was not compliant with home exercise program.  Response to previous treatment: soreness and ache  Functional change: none yet.     Pain: 5/10 pre treatment; 4-5/10 post treatment  Location: bilateral lower back but greater on R side      Objective     Carson received therapeutic exercises to develop strength, endurance, ROM, flexibility, posture and core stabilization for 30 minutes including:    Not Performed today.   Glut sets supine in hook lying x 20 with 5" hold  Sciatic nerve glides x 10 B  seated gastro and soleus stretch 2x20 sec each  Standing hip ext 2 x 10 B at counter top  Standing hip abd 2 x 10 B at counter top  Mini squat x 10  B hip abd in hook lying RTB 2 x 10  Bridge with RTB around knees for hip abd nicky x 10 - very small ROM    Carson received the following manual therapy techniques: Soft tissue Mobilization were applied to the: lumbar paraspinals for 15 minutes, including:  IASTM to the lumbar paraspinals with patient in prone  MFR to B glut med    Carson received the following supervised modalities after being cleared for contradictions: IFC electrical stimulation for pain to the lumbar " paraspinals for 10 minutes. Carson tolerated treatment well without any adverse effects.   Carson received hot pack 10 minutes to the low back during IFC e-stim for improved circulation and decreased pain.  Pt in sitting for e-stim today with     Home Exercises Provided and Patient Education Provided     Education provided:   - reiterated: drink less caffeine and increase water intake  - reiterated: listen to the body, avoiding activities that worsen symptoms    Written Home Exercises Provided: Patient instructed to cont prior HEP.  Exercises were reviewed and Carson was able to demonstrate them prior to the end of the session.  Carson demonstrated good  understanding of the education provided.     See EMR under Patient Instructions for exercises provided 10/16/19 and 10/22/19.    Assessment     Pt presents with increased tightness to lumbar paraspinals R>L today which improved with manual techniques.   Carson is progressing fairly well towards his goals.   Pt prognosis is Fair.     Pt will continue to benefit from skilled outpatient physical therapy to address the deficits listed in the problem list box on initial evaluation, provide pt/family education and to maximize pt's level of independence in the home and community environment.     Pt's spiritual, cultural and educational needs considered and pt agreeable to plan of care and goals.     Anticipated barriers to physical therapy: transportation, distance from clinic    Goals:   Short Term Goals: 4 weeks   1. Pt will report decreased pain at worst to 8/10 in order to improve ADLs.   2. Pt will presnet with increased lumbar ROM into side bending by 10% for improved mobility with ADLs  3. Pt will present with increased hip strength into abd by one half grade for increased stability to lumbar spine during ambulation.   4. Pt will present with increased ankle DF by 2 degrees for increased ease with ambulation  5. Pt will present with increased hip strength into ext by  one half grade for increased ease with sit to stand and increased endurance with standing.   6. Pt will present with increased single leg balance by 2 seconds for increased stability with ambulation.      Long Term Goals: 8 weeks   1. Pt will report decreased pain at worst to 6/10 in order to improve ADLs.   2. Pt will presnet with increased lumbar ROM into side bending by 20% for improved mobility with ADLs  3. Pt will present with increased hip strength into abd by one full grade for increased stability to lumbar spine during ambulation.   4. Pt will present with increased ankle DF by 4 degrees for increased ease with ambulation  5. Pt will present with increased hip strength into ext by one full grade for increased ease with sit to stand and increased endurance with standing.   6. Pt will present with increased single leg balance by 4 seconds for increased stability with ambulation.  7. Pt will be independent with HEP upon discharge.    Plan     Resume stretches and exercises. Mechanical traction. Possible dry needling.     Jayson Gomez, PT

## 2019-11-06 ENCOUNTER — CLINICAL SUPPORT (OUTPATIENT)
Dept: REHABILITATION | Facility: HOSPITAL | Age: 49
End: 2019-11-06
Payer: MEDICARE

## 2019-11-06 DIAGNOSIS — R53.1 DECREASED STRENGTH: ICD-10-CM

## 2019-11-06 DIAGNOSIS — M79.18 MYOFASCIAL MUSCLE PAIN: ICD-10-CM

## 2019-11-06 DIAGNOSIS — M54.16 LUMBAR RADICULOPATHY: ICD-10-CM

## 2019-11-06 PROCEDURE — 97530 THERAPEUTIC ACTIVITIES: CPT | Mod: PN,59

## 2019-11-06 PROCEDURE — 97140 MANUAL THERAPY 1/> REGIONS: CPT | Mod: PN

## 2019-11-06 NOTE — PROGRESS NOTES
"  Physical Therapy Daily Treatment Note     Name: Carson Rivers  Clinic Number: [id number]    Therapy Diagnosis:   Encounter Diagnoses   Name Primary?    Lumbar radiculopathy     Decreased strength     Myofascial muscle pain      Physician: Referring, Unknown    Visit Date: 11/6/2019    Physician Orders: PT eval and treat  Medical Diagnosis: Left foot drop [M21.372]  Evaluation Date: 10/16/2019  Authorization Period Expiration: 12/31/2019  Plan of Care Certification Period: 12/4/2019  Visit #/Visits authorized: 4 / 20     Time In: 10:15 (pateient arrived to appt late due to traffic)  Time Out: 11:00  Total Billable Time: 25 minutes    Precautions: Fall    Subjective     Pt reports: did a lot over the weekend and paid for it Tha morning but better now. He reports continued difficulty with getting up from kneeling. He reports increased incidence of екатерина horse in his R leg. He notices increased pain since beginning therapy, but also increased function with walking. He is interested in dry needling to lumbar spine but not today.     He was not compliant with home exercise program.  Response to previous treatment: soreness and ache  Functional change: able to walk longer distance    Pain: 5/10 pre treatment; 4-5/10 post treatment  Location: bilateral lower back but greater on R side      Objective     Carson received therapeutic exercises to develop strength, endurance, ROM, flexibility, posture and core stabilization for 0 minutes including:    Not Performed today.   Glut sets supine in hook lying x 20 with 5" hold  Sciatic nerve glides x 10 B  seated gastro and soleus stretch 2x20 sec each  Standing hip ext 2 x 10 B at counter top  Standing hip abd 2 x 10 B at counter top  Mini squat x 10  B hip abd in hook lying RTB 2 x 10  Bridge with RTB around knees for hip abd nicky x 10 - very small ROM    Carson received the following manual therapy techniques: Soft tissue Mobilization were applied to the: lumbar paraspinals " for 15 minutes, including:  IASTM to the lumbar paraspinals with patient in prone  MFR to B glut med    Carson received the following supervised modalities after being cleared for contradictions: Mechanical Traction:  Carson received intermittent mechanical traction to the lumbar spine at a force of 105 pounds for a total of 5 minutes. Hold time of 30 seconds and rest time for 10  Minutes. Patient tolerated treatment without any adverse effects but did not like to pressure on his abdomen and requested to end treatment prior to time frame indicated.    Carson received the following therapeutic activities including review of home NMES unit to adjust settings for lumbar spine. Total of 10 minutes.     NP  Carson received the following supervised modalities after being cleared for contradictions: IFC electrical stimulation for pain to the lumbar paraspinals for 10 minutes. Carson tolerated treatment well without any adverse effects.   Carson received hot pack 10 minutes to the low back during IFC e-stim for improved circulation and decreased pain.  Pt in sitting for e-stim today with     Home Exercises Provided and Patient Education Provided     Education provided:   - reiterated: drink less caffeine and increase water intake  - reiterated: listen to the body, avoiding activities that worsen symptoms    Written Home Exercises Provided: Patient instructed to cont prior HEP.  Exercises were reviewed and Carson was able to demonstrate them prior to the end of the session.  Carson demonstrated good  understanding of the education provided.     See EMR under Patient Instructions for exercises provided 10/16/19 and 10/22/19.    Assessment     Pt presents with increased tightness to lumbar paraspinals R>L today which improved with manual techniques.   Carson is progressing fairly well towards his goals.   Pt prognosis is Fair.     Pt will continue to benefit from skilled outpatient physical therapy to address the deficits listed in  the problem list box on initial evaluation, provide pt/family education and to maximize pt's level of independence in the home and community environment.     Pt's spiritual, cultural and educational needs considered and pt agreeable to plan of care and goals.     Anticipated barriers to physical therapy: transportation, distance from clinic    Goals:   Short Term Goals: 4 weeks   1. Pt will report decreased pain at worst to 8/10 in order to improve ADLs.   2. Pt will presnet with increased lumbar ROM into side bending by 10% for improved mobility with ADLs  3. Pt will present with increased hip strength into abd by one half grade for increased stability to lumbar spine during ambulation.   4. Pt will present with increased ankle DF by 2 degrees for increased ease with ambulation  5. Pt will present with increased hip strength into ext by one half grade for increased ease with sit to stand and increased endurance with standing.   6. Pt will present with increased single leg balance by 2 seconds for increased stability with ambulation.      Long Term Goals: 8 weeks   1. Pt will report decreased pain at worst to 6/10 in order to improve ADLs.   2. Pt will presnet with increased lumbar ROM into side bending by 20% for improved mobility with ADLs  3. Pt will present with increased hip strength into abd by one full grade for increased stability to lumbar spine during ambulation.   4. Pt will present with increased ankle DF by 4 degrees for increased ease with ambulation  5. Pt will present with increased hip strength into ext by one full grade for increased ease with sit to stand and increased endurance with standing.   6. Pt will present with increased single leg balance by 4 seconds for increased stability with ambulation.  7. Pt will be independent with HEP upon discharge.    Plan     Resume stretches and exercises. Mechanical traction. Possible dry needling.     Jayson Gomez, PT

## 2019-11-21 ENCOUNTER — CLINICAL SUPPORT (OUTPATIENT)
Dept: REHABILITATION | Facility: HOSPITAL | Age: 49
End: 2019-11-21
Payer: MEDICARE

## 2019-11-21 DIAGNOSIS — M79.18 MYOFASCIAL MUSCLE PAIN: ICD-10-CM

## 2019-11-21 DIAGNOSIS — R53.1 DECREASED STRENGTH: ICD-10-CM

## 2019-11-21 DIAGNOSIS — M54.16 LUMBAR RADICULOPATHY: ICD-10-CM

## 2019-11-21 PROCEDURE — 97110 THERAPEUTIC EXERCISES: CPT | Mod: PN

## 2019-11-21 PROCEDURE — 97140 MANUAL THERAPY 1/> REGIONS: CPT | Mod: PN

## 2019-11-21 NOTE — PROGRESS NOTES
"  Physical Therapy Daily Treatment Note     Name: Carson Rivers  Clinic Number: [id number]    Therapy Diagnosis:   Encounter Diagnoses   Name Primary?    Lumbar radiculopathy     Decreased strength     Myofascial muscle pain      Physician: Referring, Unknown    Visit Date: 11/21/2019    Physician Orders: PT eval and treat  Medical Diagnosis: Left foot drop [M21.372]  Evaluation Date: 10/16/2019  Authorization Period Expiration: 12/31/2019  Plan of Care Certification Period: 12/4/2019  Visit #/Visits authorized: 7 / 20     Time In: 9:45   Time Out: 10:40  Total Billable Time: 55 minutes    Precautions: Fall    Subjective     Pt reports: this last week was not good. He was hurting a lot. He reports he was sick and did a lot of walking in the woods going hunting two days over the weekend. He reports he had lack of sleep waking up at 3:30 to go hunting and feels this attributes to his back pain. He is still interested in trying dry needling and aquatic therapy. He may go hunting again today.      He was somewhat compliant with home exercise program.  Response to previous treatment: soreness   Functional change: continues with ability to walk longer distance without increased pain but limited with long distances    Pain: 5/10 pre treatment; no number given but reports decreased pain  Location: bilateral lower back but greater on R side      Objective     Carson received therapeutic exercises to develop strength, endurance, ROM, flexibility, posture and core stabilization for 45 minutes including:  Recumbent bike x 5 minutes level 3 and increased to 6.1 resistance over time  Seated lumbar side bending with arm reach overhead 10 x 5 seconds   Seated lumbar rotation stretch 10 x 5 seconds  Seated Glut sets x 20 with 5" hold  Seated Sciatic nerve glides x 10 B  seated gastro and soleus stretch 2x20 sec each  Seated TA contraction x 10 with 5 second hold  Seated lumbar flexion over large blue therapy ball x 10 fwd and " B  Seated ankle DF x 20  Seated ankle eversion x 20 B with use of hand on knee to decrease compensation with hip    NP  Standing hip ext 2 x 10 B at counter top  Standing hip abd 2 x 10 B at counter top  Mini squat x 10  B hip abd in hook lying RTB 2 x 10  Bridge with RTB around knees for hip abd nicky x 10 - very small ROM    Carson received the following manual therapy techniques: Soft tissue Mobilization were applied to the: lumbar paraspinals for 10 minutes, including:  IASTM to the lumbar paraspinals with patient in prone  MFR to B glut med    Pt declined e-stim today.  Carson received the following supervised modalities after being cleared for contradictions: IFC electrical stimulation for pain to the lumbar paraspinals for 10 minutes. Carson tolerated treatment well without any adverse effects.   Carson received hot pack 10 minutes to the low back during IFC e-stim for improved circulation and decreased pain.  Pt in sitting for e-stim today with     Home Exercises Provided and Patient Education Provided     Education provided:   - listen to body and decrease activity to limit increase in pain. Take rests often to decrease pain to lumbar spine.     Written Home Exercises Provided: yes.  Exercises were reviewed and Carson was able to demonstrate them prior to the end of the session.  Carson demonstrated good  understanding of the education provided.     See EMR under Patient Instructions for exercises provided 10/16/19 and 10/22/19, 11/21/19.    Assessment     Pt progressed with strengthening in seated position without increased pain to lumbar spine, felt good stretch.  He declined e-stim today. He presents with overall improved soft tissue mobility to lumbar spine.   Carson is progressing fairly well towards his goals.   Pt prognosis is Fair.     Pt will continue to benefit from skilled outpatient physical therapy to address the deficits listed in the problem list box on initial evaluation, provide pt/family  education and to maximize pt's level of independence in the home and community environment.     Pt's spiritual, cultural and educational needs considered and pt agreeable to plan of care and goals.     Anticipated barriers to physical therapy: transportation, distance from clinic    Goals:   Short Term Goals: 4 weeks   1. Pt will report decreased pain at worst to 8/10 in order to improve ADLs.   2. Pt will presnet with increased lumbar ROM into side bending by 10% for improved mobility with ADLs  3. Pt will present with increased hip strength into abd by one half grade for increased stability to lumbar spine during ambulation.   4. Pt will present with increased ankle DF by 2 degrees for increased ease with ambulation  5. Pt will present with increased hip strength into ext by one half grade for increased ease with sit to stand and increased endurance with standing.   6. Pt will present with increased single leg balance by 2 seconds for increased stability with ambulation.      Long Term Goals: 8 weeks   1. Pt will report decreased pain at worst to 6/10 in order to improve ADLs.   2. Pt will presnet with increased lumbar ROM into side bending by 20% for improved mobility with ADLs  3. Pt will present with increased hip strength into abd by one full grade for increased stability to lumbar spine during ambulation.   4. Pt will present with increased ankle DF by 4 degrees for increased ease with ambulation  5. Pt will present with increased hip strength into ext by one full grade for increased ease with sit to stand and increased endurance with standing.   6. Pt will present with increased single leg balance by 4 seconds for increased stability with ambulation.  7. Pt will be independent with HEP upon discharge.    Plan     Possible dry needling. Possible referral to aquatic therapy.     Jayson Gomez, PT

## 2019-11-29 ENCOUNTER — CLINICAL SUPPORT (OUTPATIENT)
Dept: REHABILITATION | Facility: HOSPITAL | Age: 49
End: 2019-11-29
Payer: MEDICARE

## 2019-11-29 DIAGNOSIS — R53.1 DECREASED STRENGTH: ICD-10-CM

## 2019-11-29 DIAGNOSIS — M79.18 MYOFASCIAL MUSCLE PAIN: ICD-10-CM

## 2019-11-29 DIAGNOSIS — M54.16 LUMBAR RADICULOPATHY: ICD-10-CM

## 2019-11-29 PROCEDURE — 97110 THERAPEUTIC EXERCISES: CPT | Mod: PN

## 2019-11-29 NOTE — PROGRESS NOTES
"  Physical Therapy Daily Treatment Note     Name: Carson Rivers  Clinic Number: [id number]    Therapy Diagnosis:   Encounter Diagnoses   Name Primary?    Lumbar radiculopathy     Decreased strength     Myofascial muscle pain      Physician: Referring, Unknown    Visit Date: 11/29/2019    Physician Orders: PT eval and treat  Medical Diagnosis: Left foot drop [M21.372]  Evaluation Date: 10/16/2019  Authorization Period Expiration: 12/31/2019  Plan of Care Certification Period: 12/4/2019  Visit #/Visits authorized: 8 / 20     Time In: 12:03  Time Out: 12:55  Total Billable Time: 48 minutes    Precautions: Fall    Subjective     Pt reports: he is fatigued. Butt cheeks don't lock up, but he gets tired. His back is not hurting. Sunday, Monday, and Tuesday he went hunting and did ok with that.    He was somewhat compliant with home exercise program.  Response to previous treatment: felt ok, his hips and legs are weak  Functional change: continues with ability to walk longer distance without increased pain but limited with long distances    Pain: 5/10 pre treatment  Location: bilateral lower back but greater on R side      Objective     Carson received therapeutic exercises to develop strength, endurance, ROM, flexibility, posture and core stabilization for 45 minutes including:  Recumbent bike x 10 minutes level  and increased to 6-8 resistance over time  Shuttle leg press double leg 3 bands 2 x 10 B  Shuttle leg press singe leg 3 bands 2 x 10 each  Side lying shuttle 2 bands 2 x 10   Shuttle heel raises 1 band 2 x 10   Standing gastro and soleus stretch 2x20 sec each  Seated ankle DF x 30  Seated ankle eversion x 20 B with use of hand on knee to decrease compensation with hip  Seated Glut sets x 20 with 5" hold  Seated TA contraction x 10 with 5 second hold  Seated lumbar side bending with arm reach overhead 10 x 5 seconds   Seated lumbar rotation stretch 10 x 5 seconds    NP:  Seated lumbar flexion over large blue therapy " ball x 10 fwd and B   Seated Sciatic nerve glides x 10 B  Standing hip ext 2 x 10 B at counter top  Standing hip abd 2 x 10 B at counter top  Mini squat x 10  B hip abd in hook lying RTB 2 x 10  Bridge with RTB around knees for hip abd nicky x 10 - very small ROM    NP today  Carson received the following manual therapy techniques: Soft tissue Mobilization were applied to the: lumbar paraspinals for 10 minutes, including:  IASTM to the lumbar paraspinals with patient in prone  MFR to B glut med    Pt declined e-stim today.  Carson received the following supervised modalities after being cleared for contradictions: IFC electrical stimulation for pain to the lumbar paraspinals for 10 minutes. Carson tolerated treatment well without any adverse effects.   Carson received hot pack 10 minutes to the low back during IFC e-stim for improved circulation and decreased pain.  Pt in sitting for e-stim today with     Home Exercises Provided and Patient Education Provided     Education provided:   - listen to body and decrease activity to limit increase in pain. Take rests often to decrease pain to lumbar spine.     Written Home Exercises Provided: Patient instructed to cont prior HEP.  Exercises were reviewed and Carson was able to demonstrate them prior to the end of the session.  Carson demonstrated good  understanding of the education provided.     See EMR under Patient Instructions for exercises provided 10/16/19 and 10/22/19, 11/21/19.    Assessment     Pt progressed with duration on recumbent bike and performed shuttle. He reports feeling good after treatment with stretching. He required cues to perform heel raise on shuttle without knee extension. She performed seated TA contraction with good technique however required cues to keep breathing.    Carson is progressing fairly well towards his goals.   Pt prognosis is Fair.     Pt will continue to benefit from skilled outpatient physical therapy to address the deficits listed in  the problem list box on initial evaluation, provide pt/family education and to maximize pt's level of independence in the home and community environment.     Pt's spiritual, cultural and educational needs considered and pt agreeable to plan of care and goals.     Anticipated barriers to physical therapy: transportation, distance from clinic    Goals:   Short Term Goals: 4 weeks (in progress, not met)  1. Pt will report decreased pain at worst to 8/10 in order to improve ADLs.   2. Pt will presnet with increased lumbar ROM into side bending by 10% for improved mobility with ADLs  3. Pt will present with increased hip strength into abd by one half grade for increased stability to lumbar spine during ambulation.   4. Pt will present with increased ankle DF by 2 degrees for increased ease with ambulation  5. Pt will present with increased hip strength into ext by one half grade for increased ease with sit to stand and increased endurance with standing.   6. Pt will present with increased single leg balance by 2 seconds for increased stability with ambulation.      Long Term Goals: 8 weeks (in progress, not met)  1. Pt will report decreased pain at worst to 6/10 in order to improve ADLs.   2. Pt will presnet with increased lumbar ROM into side bending by 20% for improved mobility with ADLs  3. Pt will present with increased hip strength into abd by one full grade for increased stability to lumbar spine during ambulation.   4. Pt will present with increased ankle DF by 4 degrees for increased ease with ambulation  5. Pt will present with increased hip strength into ext by one full grade for increased ease with sit to stand and increased endurance with standing.   6. Pt will present with increased single leg balance by 4 seconds for increased stability with ambulation.  7. Pt will be independent with HEP upon discharge.    Plan     Possible dry needling. Possible referral to aquatic therapy.     Jayson Gomez, PT

## 2019-12-06 ENCOUNTER — CLINICAL SUPPORT (OUTPATIENT)
Dept: REHABILITATION | Facility: HOSPITAL | Age: 49
End: 2019-12-06
Payer: MEDICARE

## 2019-12-06 DIAGNOSIS — M54.16 LUMBAR RADICULOPATHY: ICD-10-CM

## 2019-12-06 DIAGNOSIS — R53.1 DECREASED STRENGTH: ICD-10-CM

## 2019-12-06 DIAGNOSIS — M79.18 MYOFASCIAL MUSCLE PAIN: ICD-10-CM

## 2019-12-06 PROCEDURE — 97110 THERAPEUTIC EXERCISES: CPT | Mod: PN

## 2019-12-06 NOTE — PROGRESS NOTES
Physical Therapy Daily Treatment and Discharge Note     Name: Carson Rivers  Clinic Number: [id number]    Therapy Diagnosis:   Encounter Diagnoses   Name Primary?    Lumbar radiculopathy     Decreased strength     Myofascial muscle pain      Physician: Referring, Unknown    Visit Date: 12/6/2019    Physician Orders: PT eval and treat  Medical Diagnosis: Left foot drop [M21.372]  Evaluation Date: 10/16/2019  Authorization Period Expiration: 12/31/2019  Plan of Care Certification Period: 12/4/2019  Visit #/Visits authorized: 9 / 20     Time In: 8:05  Time Out: 9:00  Total Billable Time: 50 minutes    Precautions: Fall    Subjective     Pt reports: he is feeling pretty good and feels he has less pain in his back. He went hunting this past week and really feels increased pain after hunting, but reports he has difficulty climbing the ladder to get into the hunting blind and notes overall fatigue to LE. Back pain that he had when he started therapy is no longer there. Pt is agreeable to DC to HEP today, but does express that he would like to try aquatic therapy to continue to decrease stress to lumbar spine.  He was somewhat compliant with home exercise program.  Response to previous treatment: felt ok  Functional change: continues with ability to walk longer distance without increased pain but limited with long distances due to fatigue    Pain: 2-3/10 pre treatment  Location: bilateral lower back but greater on R side      Objective     Lumbar Special Tests +/-   Slump -   Hamstring Length Left 65   Hamstring Length Right 70      Lumbar  Strength Increased Pain?   Flexion 5/5 no   Extension 5/5 no   Side Bending Right 5/5 no   Side Bending Left 5/5 no   *Tested with patient seated     Lumbar  ROM Increased Pain?   Flexion 90% no   Extension 50% no   Side Bending Right 45% no   Side Bending Left 40% no      Hip Strength Left Right   Flexion 5/5 5/5   Extension 5/5 5/5   Abduction 5/5 5/5      Knee Strength Left Right    Flexion 5/5 5/5   Extension 5/5 5/5      Ankle strength Left Right   Dorsiflexion 4-/5 5/5   Plantarflexion 3/5 5/5   Eversion 1/5 5/5   Inversion 1/5 4+/5   *Tested with patient supine  Ankle AROM Left Right   PF 24 34   DF 2 10   Inversion AROM 10; PROM 23 30   Eversion AROM 0; PROM24 17      Balance:   Single leg stance: 19 seconds on R with fair to good balance strategies; 2 seconds on L with poor balance strategies and required assistance keep loosing balance      Carson received therapeutic exercises to develop strength, endurance, ROM, flexibility, posture and core stabilization for 25 minutes including:  Recumbent bike x 10 minutes level  and increased to 6-8 resistance over time  Shuttle leg press double leg 3 bands 2 x 15 B  Shuttle leg press singe leg 3 bands 2 x 15 each  Side lying shuttle 2 bands 2 x 15   Shuttle heel raises 1 band 2 x 10     Home Exercises Provided and Patient Education Provided     Education provided:   - good progress while in therapy, one additional visit to make a visit missed earlier this week is not necessary.   - Follow up with MD regarding aquatic therapy for referral.   - Education to wife and patient regarding progress in therapy and to perform exercises at home to maintain gains made in therapy.   - discussed possibility to aquatic therapy to decrease stress to lumbar spine.     Written Home Exercises Provided: Patient instructed to cont prior HEP.  Exercises were reviewed and Carson was able to demonstrate them prior to the end of the session.  Carson demonstrated good  understanding of the education provided.     See EMR under Patient Instructions for exercises provided 10/16/19 and 10/22/19, 11/21/19.    Assessment     Pt has made good progress in therapy with noted increase in ROM and strength to lumbar spine and LE. He demonstrates significant increased in balance with single leg stance on R. He presents with improved lumbar soft tissue mobility. He performed all  exercises without reports of increased pain to lumbar spine. Pt may possibly benefit from aquatic therapy to decrease stress to lumbar spine; pt did not respond well to mechanical traction.   Carson is progressing fairly well towards his goals.   Pt prognosis is Fair.     Pt will continue to benefit from skilled outpatient physical therapy to address the deficits listed in the problem list box on initial evaluation, provide pt/family education and to maximize pt's level of independence in the home and community environment.     Pt's spiritual, cultural and educational needs considered and pt agreeable to plan of care and goals.     Anticipated barriers to physical therapy: transportation, distance from clinic    Goals:   Short Term Goals: 4 weeks   1. Pt will report decreased pain at worst to 8/10 in order to improve ADLs. - MET 4/10  2. Pt will presnet with increased lumbar ROM into side bending by 10% for improved mobility with ADLs - MET  3. Pt will present with increased hip strength into abd by one half grade for increased stability to lumbar spine during ambulation. - MET  4. Pt will present with increased ankle DF by 2 degrees for increased ease with ambulation - MET  5. Pt will present with increased hip strength into ext by one half grade for increased ease with sit to stand and increased endurance with standing. - MET   6. Pt will present with increased single leg balance by 2 seconds for increased stability with ambulation. - MET on R     Long Term Goals: 8 weeks   1. Pt will report decreased pain at worst to 6/10 in order to improve ADLs. - MET 4/10  2. Pt will presnet with increased lumbar ROM into side bending by 20% for improved mobility with ADLs - MET  3. Pt will present with increased hip strength into abd by one full grade for increased stability to lumbar spine during ambulation.  - MET  4. Pt will present with increased ankle DF by 4 degrees for increased ease with ambulation - MET  5. Pt will  present with increased hip strength into ext by one full grade for increased ease with sit to stand and increased endurance with standing. - MET   6. Pt will present with increased single leg balance by 4 seconds for increased stability with ambulation. - MET on R  7. Pt will be independent with HEP upon discharge. - MET    Plan     DC to HEP today.     Jayson Gomez, PT

## 2019-12-16 DIAGNOSIS — M79.673 FOOT PAIN: Primary | ICD-10-CM

## 2019-12-30 ENCOUNTER — CLINICAL SUPPORT (OUTPATIENT)
Dept: REHABILITATION | Facility: HOSPITAL | Age: 49
End: 2019-12-30
Payer: MEDICARE

## 2019-12-30 DIAGNOSIS — M79.671 PAIN IN BOTH FEET: ICD-10-CM

## 2019-12-30 DIAGNOSIS — M79.672 PAIN IN BOTH FEET: ICD-10-CM

## 2019-12-30 DIAGNOSIS — Z74.09 IMPAIRED FUNCTIONAL MOBILITY, BALANCE, AND ENDURANCE: ICD-10-CM

## 2019-12-30 PROCEDURE — 97162 PT EVAL MOD COMPLEX 30 MIN: CPT | Mod: PO | Performed by: PHYSICAL THERAPIST

## 2019-12-30 NOTE — PLAN OF CARE
[facility]  Physical Therapy Initial Evaluation    Name: Carson Rivers  Clinic Number: [id number]    Therapy Diagnosis:   Encounter Diagnoses   Name Primary?    Pain in both feet     Impaired functional mobility, balance, and endurance      Physician: Farrukh Cleveland MD    Physician Orders: Aquatic Therapy   Medical Diagnosis from Referral: Foot pain   Evaluation Date: 12/30/2019  Authorization Period Expiration: 12/31/2019  Plan of Care Expiration: 02/14/2020  Visit # / Visits authorized: 1/ 20    Time In: 11:00 AM   Time Out: 11:45 AM  Total Billable Time: 45 minutes    Precautions: Standard    Subjective   Date of onset: March 2016; has worsened over last several months  History of current condition - Carson reports: A recent bout of physical therapy that was greatly beneficial. However, he continues to have some weakness in the hips and lower extremities and difficulty walking for long distances. His most recent episode of therapy was land based. He is most worried about his leg strength and being able to walk for a longer period of time.        Past Medical History:   Diagnosis Date    Hyperlipidemia     Hypertension      Carson Rivers  has no past surgical history on file.    Carson has a current medication list which includes the following prescription(s): amlodipine, aspirin, famotidine, gabapentin, labetalol, meloxicam, and spironolactone.    Review of patient's allergies indicates:  No Known Allergies     Imaging: No recent imaging     Prior Therapy: Multiple episodes of land based physical therapy for back and shoulder   Social History:  lives with their family  Occupation: Currently on disability   Prior Level of Function: Significant limitations with walking for long distances   Current Level of Function: Some improvement in walking endurance, but still limitations. He has more difficulty walking on uneven terrain     Pain:  Current 5/10, worst 7/10, best 0/10   Location:  right foott   Description: Aching  Aggravating Factors: Standing and Walking  Easing Factors: rest and elevation    Pts goals: To improve lower extremity strength and to be able to walk for longer distances     Objective   Mental status: alert, oriented x3  Posture/ Alignment: Fair, Maintains flexed posture with an increased thoracic kyphosis    GAIT DEVIATIONS: Carson amb with decreased daquan, decreased step length and decreased gait speed .    ROM:   AROM  Comment   Flexion: 90%     Extension: 50%     Lat Flex R: Joint line      Lat Flex L: Joint line      Rotation R: 90%     Rotation L: 90%     *pain    Strength: Dermatomes:   Right Left Comment   L2 intact intact    L3 intact intact    L4 intact intact    L5 intact intact    S1 intact intact    S2 intact intact    Saddle intact intact      Myotomes:   Right Left Comment   Hip flexion (L2-3): 5/5 5/5    Knee extension (L3-4): 5/5 5/5    DF (L4-5): 5/5 4-/5    Great Toe Ext (L5-S1): 5/5 trace    Great Toe Flex (S1-S2): 5/5 trace      Hip Abductors: 5/5 bilaterally   Hip extensors: 5/5 bilaterally     DTR:   Right Left Comment   Patellar (L3-4) 0 0    Achilles (S1) 1+ 1+        Functional Tests (* indicates w/ pain)   SLS: R: 5 seconds fair balance strategies  L: 2 seconds poor balance strategies   SLR: (-) for pain   Beto test: (+) bilaterally     Palpation:  No TTP elicited     Pt/family was provided educational information, including: role of PT, goals for PT, scheduling - pt verbalized understanding. Discussed insurance plan with pt.     CMS Impairment/Limitation/Restriction for FOTO Foot Survey    Therapist reviewed FOTO scores for Carson Rivers on 12/30/2019.   FOTO documents entered into [employer] - see Media section.    Limitation Score: 55%  Category: Mobility    Current : CK = at least 40% but < 60% impaired, limited or restricted         Home Exercises and Patient Education Provided    Education provided:   - POC  - Benefits of aquatic therapy      Written Home Exercises Provided: No HEP issued .  Exercises were reviewed and Carson was able to demonstrate them prior to the end of the session.  Carson demonstrated good  understanding of the education provided.       Assessment   Pt presents with a medical diagnosis of foot pain. He presents with significant neurological weakness in the left foot, bilateral foot pain, hip flexor contractures and limited tolerance to prolonged walking. He will benefit from participation in aquatic therapy to assist in improving walking endurance, decreasing pain and transitioning to a land based gym program.     Pt prognosis is Good.   Pt will benefit from skilled outpatient Physical Therapy to address the deficits stated above and in the chart below, provide pt/family education, and to maximize pt's level of independence.     Plan of care discussed with patient: Yes  Pt's spiritual, cultural and educational needs considered and patient is agreeable to the plan of care and goals as stated below:     Anticipated Barriers for therapy: None at this time     Medical Necessity is demonstrated by the following  History  Co-morbidities and personal factors that may impact the plan of care Co-morbidities:   high BMI and prior lumbar surgery    Personal Factors:   lifestyle     moderate   Examination  Body Structures and Functions, activity limitations and participation restrictions that may impact the plan of care Body Regions:   back  lower extremities    Body Systems:    gross symmetry  ROM  strength  balance  gait    Participation Restrictions:       Activity limitations:   Learning and applying knowledge  no deficits    General Tasks and Commands  no deficits    Communication  no deficits    Mobility  walking    Self care  washing oneself (bathing, drying, washing hands)  caring for body parts (brushing teeth, shaving, grooming)    Domestic Life  doing house work (cleaning house, washing dishes,  laundry)    Interactions/Relationships  no deficits    Life Areas  employment    Community and Social Life  community life  recreation and leisure         high   Clinical Presentation evolving clinical presentation with changing clinical characteristics moderate   Decision Making/ Complexity Score: moderate     Goals:  Short Term Goals: 3 weeks   1) Pt will be I with established HEP   2) SLS on the right LE will be 10 seconds or longer to demonstrate improved balance   3) Pt will tolerate 20 minutes of walking without an increase in LBP     Long Term Goals: 6 weeks   1) Pt will improve left SLS to 5 seconds or greater to improve safety of ambulation on unlevel surfaces  2) Pain will be no worse then 4/10 during all ADL related activities   3) Carson will walk community distances on level/unlevel surfaces with pain no worse then 4/10        Plan   Plan of care Certification: 12/30/2019 to 02/14/2020.    Outpatient Physical Therapy 2 times weekly for 6 weeks to include the following interventions: Aquatic Therapy, Manual Therapy, Neuromuscular Re-ed, Patient Education, Therapeutic Exercise and dry needling .     Jose Clark, PT

## 2020-01-08 ENCOUNTER — CLINICAL SUPPORT (OUTPATIENT)
Dept: REHABILITATION | Facility: HOSPITAL | Age: 50
End: 2020-01-08
Payer: MEDICARE

## 2020-01-08 DIAGNOSIS — R53.1 DECREASED STRENGTH: ICD-10-CM

## 2020-01-08 DIAGNOSIS — M54.16 LUMBAR RADICULOPATHY: ICD-10-CM

## 2020-01-08 DIAGNOSIS — M79.18 MYOFASCIAL MUSCLE PAIN: ICD-10-CM

## 2020-01-08 PROCEDURE — 97113 AQUATIC THERAPY/EXERCISES: CPT | Mod: PO,CQ

## 2020-01-08 NOTE — PROGRESS NOTES
Physical Therapy Aquatic Treatment Note     Name: Carson Rivers  Clinic Number: [id number]    Therapy Diagnosis:   Encounter Diagnoses   Name Primary?    Lumbar radiculopathy     Decreased strength     Myofascial muscle pain      Physician: Farrukh Cleveland MD  Visit Date: 1/8/2020  Physician Orders: Aquatic Therapy   Medical Diagnosis from Referral: Foot pain   Evaluation Date: 12/30/2019  Authorization Period Expiration: 12/31/2019  Plan of Care Expiration: 02/14/2020  Visit # / Visits authorized: 2/ 20     Time In: 9:10 AM   Time Out: 10:00 AM  Total Billable Time: 50 minutes     Precautions: Standard      Subjective     Pt reports: he was compliant with home exercise program given last session. He denies pn on this date    Pain: 0/10  Location:  back     Objective     Carson received aquatic exercises to develop strength, endurance, ROM, flexibility, posture and core stabilization for 50 minutes including:  Amb FWD,BWD,Side 3 min each    Stretches 3 x 20 sec  HSS  Quad  GSS    LE exs 20x each  Mini Squat with QS  Heel Raise with GS  Hip flex/ext  Hip ABD/ADD  Hip Circles CW/CCW    SLS 3 x 30 sec    Endurance 3 min  Marching    Home Exercises Provided and Patient Education Provided     Education provided:   - progress towards goals   - role of therapy     Written Home Exercises Provided: Patient was not issued HEP for pool.  Exercises were reviewed and Carson was able to demonstrate them prior to the end of the session.   Pt received a written copy of exercises to perform at home.     Carson demonstrated good  understanding of the education provided.     Assessment     Carson mayela today's tx fair with some evidence of fatigue and deconditioning requiring several short rest breaks. His amb on TM in pool is good however does require some concentration due to foot drop on L. He is safely able to perform all directions. He did report onset of some minor LBP with GS activity with no residual effect post completing this  activity  Carson is progressing well towards his goals.   Pt prognosis is Good.     Pt will continue to benefit from skilled outpatient physical therapy to address the deficits listed in the problem list box on initial evaluation, provide pt/family education and to maximize pt's level of independence in the home and community environment.     Pt's spiritual, cultural and educational needs considered and pt agreeable to plan of care and goals.    Anticipated barriers to physical therapy: none    Goals:     Short Term Goals: 3 weeks   1) Pt will be I with established HEP   2) SLS on the right LE will be 10 seconds or longer to demonstrate improved balance   3) Pt will tolerate 20 minutes of walking without an increase in LBP      Long Term Goals: 6 weeks   1) Pt will improve left SLS to 5 seconds or greater to improve safety of ambulation on unlevel surfaces  2) Pain will be no worse then 4/10 during all ADL related activities   3) Carson will walk community distances on level/unlevel surfaces with pain no worse then 4/10    Plan     Continue 2x per week. Outpatient Physical Therapy 2 times weekly for 6 weeks to include the following interventions: Aquatic Therapy, Manual Therapy, Neuromuscular Re-ed, Patient Education, Therapeutic Exercise and dry needling        Ruperto Eng PTA

## 2020-01-17 ENCOUNTER — CLINICAL SUPPORT (OUTPATIENT)
Dept: REHABILITATION | Facility: HOSPITAL | Age: 50
End: 2020-01-17
Payer: MEDICARE

## 2020-01-17 DIAGNOSIS — M79.671 PAIN IN BOTH FEET: ICD-10-CM

## 2020-01-17 DIAGNOSIS — M54.16 LUMBAR RADICULOPATHY: ICD-10-CM

## 2020-01-17 DIAGNOSIS — M79.18 MYOFASCIAL MUSCLE PAIN: ICD-10-CM

## 2020-01-17 DIAGNOSIS — R53.1 DECREASED STRENGTH: ICD-10-CM

## 2020-01-17 DIAGNOSIS — M79.672 PAIN IN BOTH FEET: ICD-10-CM

## 2020-01-17 DIAGNOSIS — Z74.09 IMPAIRED FUNCTIONAL MOBILITY, BALANCE, AND ENDURANCE: ICD-10-CM

## 2020-01-17 PROCEDURE — 97113 AQUATIC THERAPY/EXERCISES: CPT | Mod: PO,CQ

## 2020-01-17 NOTE — PROGRESS NOTES
"  Physical Therapy Aquatic Treatment Note     Name: Carson Rivers  Clinic Number: [id number]    Therapy Diagnosis:   Encounter Diagnoses   Name Primary?    Pain in both feet     Impaired functional mobility, balance, and endurance     Lumbar radiculopathy     Decreased strength     Myofascial muscle pain      Physician: Farrukh Cleveland MD  Visit Date: 1/17/2020  Physician Orders: Aquatic Therapy   Medical Diagnosis from Referral: Foot pain   Evaluation Date: 12/30/2019  Authorization Period Expiration: 12/31/2019  Plan of Care Expiration: 02/14/2020  Visit # / Visits authorized: 3/ 20     Time In: 8:00 AM   Time Out: 9:00 AM  Total Billable Time: 50 minutes     Precautions: Standard      Subjective     Pt reports that he slipped and "did a split" and has had some interior L thigh pn since. He reports no adverse effects from last tx.    he was compliant with home exercise program given last session. He denies pn on this date    Pain: 5/10 pre-tx, 4/10 post  Location:  back     Objective     Carson received aquatic exercises to develop strength, endurance, ROM, flexibility, posture and core stabilization for 50 minutes including:  Amb FWD,BWD,Side 3 min each    Stretches 3 x 20 sec  HSS  Quad  GSS    LE exs 20x each  Mini Squat with QS  Heel Raise with GS  Hip flex/ext  Hip ABD/ADD  Hip Circles CW/CCW  Lunges Side/FWD  SLS 3 x 30 sec    Endurance 3 min  Marching    Home Exercises Provided and Patient Education Provided     Education provided:   - progress towards goals   - role of therapy     Written Home Exercises Provided: Patient was not issued HEP for pool.  Exercises were reviewed and Carson was able to demonstrate them prior to the end of the session.   Pt received a written copy of exercises to perform at home.     Carson demonstrated good  understanding of the education provided.     Assessment     Carson mayela today's tx well with benefit of decreased pn sx post tx as above. His amb on TM in pool is good " however does require some concentration due to foot drop on L. He is safely able to perform all directions.He was able to progress with bal and core stab ex today adding lunges as above.  Carson is progressing well towards his goals.   Pt prognosis is Good.     Pt will continue to benefit from skilled outpatient physical therapy to address the deficits listed in the problem list box on initial evaluation, provide pt/family education and to maximize pt's level of independence in the home and community environment.     Pt's spiritual, cultural and educational needs considered and pt agreeable to plan of care and goals.    Anticipated barriers to physical therapy: none    Goals:     Short Term Goals: 3 weeks   1) Pt will be I with established HEP   2) SLS on the right LE will be 10 seconds or longer to demonstrate improved balance   3) Pt will tolerate 20 minutes of walking without an increase in LBP      Long Term Goals: 6 weeks   1) Pt will improve left SLS to 5 seconds or greater to improve safety of ambulation on unlevel surfaces  2) Pain will be no worse then 4/10 during all ADL related activities   3) Carson will walk community distances on level/unlevel surfaces with pain no worse then 4/10    Plan     Continue 2x per week. Outpatient Physical Therapy 2 times weekly for 6 weeks to include the following interventions: Aquatic Therapy, Manual Therapy, Neuromuscular Re-ed, Patient Education, Therapeutic Exercise and dry needling        Ruperto Eng PTA

## 2020-01-21 ENCOUNTER — CLINICAL SUPPORT (OUTPATIENT)
Dept: REHABILITATION | Facility: HOSPITAL | Age: 50
End: 2020-01-21
Payer: MEDICARE

## 2020-01-21 DIAGNOSIS — M79.671 PAIN IN BOTH FEET: ICD-10-CM

## 2020-01-21 DIAGNOSIS — Z74.09 IMPAIRED FUNCTIONAL MOBILITY, BALANCE, AND ENDURANCE: ICD-10-CM

## 2020-01-21 DIAGNOSIS — M79.672 PAIN IN BOTH FEET: ICD-10-CM

## 2020-01-21 PROCEDURE — 97113 AQUATIC THERAPY/EXERCISES: CPT | Mod: PO,CQ

## 2020-01-21 NOTE — PROGRESS NOTES
Physical Therapy Aquatic Treatment Note     Name: Carson Rivers  Clinic Number: [id number]    Therapy Diagnosis:   Encounter Diagnoses   Name Primary?    Pain in both feet     Impaired functional mobility, balance, and endurance      Physician: Farrukh Cleveland MD  Visit Date: 1/21/2020  Physician Orders: Aquatic Therapy   Medical Diagnosis from Referral: Foot pain   Evaluation Date: 12/30/2019  Authorization Period Expiration: 12/31/2019  Plan of Care Expiration: 02/14/2020  Visit # / Visits authorized: 4/ 20     Time In: 3:15pm  Time Out:4:25pm  Total Billable Time: 60 minutes     Precautions: Standard      Subjective     Pt reports that he has increased mayela to amb with improved endurance and less pn.    he was compliant with home exercise program given last session. He denies pn on this date    Pain: 4/10   Location:  back     Objective     Carson received aquatic exercises to develop strength, endurance, ROM, flexibility, posture and core stabilization for 50 minutes including:  Amb FWD,BWD,Side 3 min each    Stretches 3 x 20 sec  HSS  Quad  GSS    LE exs 20x each 2#  Mini Squat with QS  Heel Raise with GS  Hip flex/ext  Hip ABD/ADD  Hip Circles CW/CCW  Lunges Side/FWD  SLS 3 x 30 sec  Step-ups    Endurance 3 min  Marching    Home Exercises Provided and Patient Education Provided     Education provided:   - progress towards goals   - role of therapy     Written Home Exercises Provided: Patient was not issued HEP for pool.  Exercises were reviewed and Carson was able to demonstrate them prior to the end of the session.   Pt received a written copy of exercises to perform at home.     Carson demonstrated good  understanding of the education provided.     Assessment     Carson mayela today's tx  with progression of ther ex well . His amb on TM in pool is good however does require some concentration due to foot drop on L and tendency to invert. He is safely able to perform all directions.He was able to progress with  strengthening ex today adding resistance and step-ups as above.  Carson is progressing well towards his goals.   Pt prognosis is Good.     Pt will continue to benefit from skilled outpatient physical therapy to address the deficits listed in the problem list box on initial evaluation, provide pt/family education and to maximize pt's level of independence in the home and community environment.     Pt's spiritual, cultural and educational needs considered and pt agreeable to plan of care and goals.    Anticipated barriers to physical therapy: none    Goals:     Short Term Goals: 3 weeks   1) Pt will be I with established HEP   2) SLS on the right LE will be 10 seconds or longer to demonstrate improved balance   3) Pt will tolerate 20 minutes of walking without an increase in LBP      Long Term Goals: 6 weeks   1) Pt will improve left SLS to 5 seconds or greater to improve safety of ambulation on unlevel surfaces  2) Pain will be no worse then 4/10 during all ADL related activities   3) Carson will walk community distances on level/unlevel surfaces with pain no worse then 4/10    Plan     Continue 2x per week. Outpatient Physical Therapy 2 times weekly for 6 weeks to include the following interventions: Aquatic Therapy, Manual Therapy, Neuromuscular Re-ed, Patient Education, Therapeutic Exercise and dry needling        Ruperto Eng PTA

## 2020-01-22 ENCOUNTER — DOCUMENTATION ONLY (OUTPATIENT)
Dept: REHABILITATION | Facility: HOSPITAL | Age: 50
End: 2020-01-22

## 2020-01-22 NOTE — PROGRESS NOTES
PT/PTA met face to face to discuss pt's treatment plan and progress towards established goals. Pt will be seen by a physical therapist minimally every 6th visit or every 30 days.

## 2020-01-28 ENCOUNTER — CLINICAL SUPPORT (OUTPATIENT)
Dept: REHABILITATION | Facility: HOSPITAL | Age: 50
End: 2020-01-28
Payer: MEDICARE

## 2020-01-28 DIAGNOSIS — M79.672 PAIN IN BOTH FEET: ICD-10-CM

## 2020-01-28 DIAGNOSIS — Z74.09 IMPAIRED FUNCTIONAL MOBILITY, BALANCE, AND ENDURANCE: ICD-10-CM

## 2020-01-28 DIAGNOSIS — M79.671 PAIN IN BOTH FEET: ICD-10-CM

## 2020-01-28 PROCEDURE — 97113 AQUATIC THERAPY/EXERCISES: CPT | Mod: PO,CQ

## 2020-01-28 NOTE — PROGRESS NOTES
Physical Therapy Aquatic Treatment Note     Name: Carson Rivers  Clinic Number: [id number]    Therapy Diagnosis:   Encounter Diagnoses   Name Primary?    Pain in both feet     Impaired functional mobility, balance, and endurance      Physician: Farrukh Cleveland MD  Visit Date: 1/28/2020  Physician Orders: Aquatic Therapy   Medical Diagnosis from Referral: Foot pain   Evaluation Date: 12/30/2019  Authorization Period Expiration: 12/31/2019  Plan of Care Expiration: 02/14/2020  Visit # / Visits authorized: 5/ 20     Time In: 3:05pm  Time Out:4:00pm  Total Billable Time: 55 minutes     Precautions: Standard      Subjective     Pt reports that his back has been hurting since Sunday, rates his sx 6/10 today. He requested to discontinue therapy post ~ 15 min due to LBP today   he was compliant with home exercise program given last session. He denies pn on this date    Pain: 6/10   Location:  back     Objective     Carson received aquatic exercises to develop strength, endurance, ROM, flexibility, posture and core stabilization for 10 minutes including:  Amb FWD,BWD,Side 3 min each    Not performed today  Stretches 3 x 20 sec  HSS  Quad  GSS      LE exs 20x each   Mini Squat with QS  Heel Raise with GS  Hip flex/ext  Hip ABD/ADD  Hip Circles CW/CCW  Lunges Side/FWD  SLS 3 x 30 sec  Step-ups    Endurance 3 min  Marching    Home Exercises Provided and Patient Education Provided     Education provided:   - progress towards goals   - role of therapy     Written Home Exercises Provided: Patient was not issued HEP for pool.  Exercises were reviewed and Carson was able to demonstrate them prior to the end of the session.   Pt received a written copy of exercises to perform at home.     Carson demonstrated good  understanding of the education provided.     Assessment     Carson with poor mayela to today's tx, discontinuing tx post 10 min due to level of back pn reported today. .   Carson is progressing poor  towards his goals today.    Pt prognosis is Good.     Pt will continue to benefit from skilled outpatient physical therapy to address the deficits listed in the problem list box on initial evaluation, provide pt/family education and to maximize pt's level of independence in the home and community environment.     Pt's spiritual, cultural and educational needs considered and pt agreeable to plan of care and goals.    Anticipated barriers to physical therapy: none    Goals:     Short Term Goals: 3 weeks   1) Pt will be I with established HEP   2) SLS on the right LE will be 10 seconds or longer to demonstrate improved balance   3) Pt will tolerate 20 minutes of walking without an increase in LBP      Long Term Goals: 6 weeks   1) Pt will improve left SLS to 5 seconds or greater to improve safety of ambulation on unlevel surfaces  2) Pain will be no worse then 4/10 during all ADL related activities   3) Carson will walk community distances on level/unlevel surfaces with pain no worse then 4/10    Plan     Pt to be re-assessed by PT.      Ruperto Eng, PTA

## 2020-01-29 ENCOUNTER — DOCUMENTATION ONLY (OUTPATIENT)
Dept: REHABILITATION | Facility: HOSPITAL | Age: 50
End: 2020-01-29

## 2020-01-29 NOTE — PROGRESS NOTES
PT/PTA met face to face to discuss pt's treatment plan and progress towards established goals. Pt will be seen by a physical therapist minimally every 6th visit or every 30 days.      Jose Clark, PT

## 2020-01-30 ENCOUNTER — CLINICAL SUPPORT (OUTPATIENT)
Dept: REHABILITATION | Facility: HOSPITAL | Age: 50
End: 2020-01-30
Payer: MEDICARE

## 2020-01-30 DIAGNOSIS — M79.672 PAIN IN BOTH FEET: ICD-10-CM

## 2020-01-30 DIAGNOSIS — Z74.09 IMPAIRED FUNCTIONAL MOBILITY, BALANCE, AND ENDURANCE: ICD-10-CM

## 2020-01-30 DIAGNOSIS — M79.671 PAIN IN BOTH FEET: ICD-10-CM

## 2020-01-30 PROCEDURE — 97164 PT RE-EVAL EST PLAN CARE: CPT | Mod: PO | Performed by: PHYSICAL THERAPIST

## 2020-01-30 NOTE — PATIENT INSTRUCTIONS
Strengthening: Hip Abductor - Resisted        With band looped around both legs above knees, push thighs apart.  Repeat ____ times per set. Do ____ sets per session. Do ____ sessions per day.     https://[email or web address]/688     Copyright © [employer]. All rights reserved.   Bridging        Slowly raise buttocks from floor, keeping stomach tight.  Repeat ____ times per set. Do ____ sets per session. Do ____ sessions per day.     https://[email or web address]/1096     Copyright © [employer]. All rights reserved.

## 2020-01-30 NOTE — PLAN OF CARE
[facility]  Physical Therapy Reassessment    Name: Carson Rivers  Clinic Number: [id number]    Therapy Diagnosis:   Encounter Diagnoses   Name Primary?    Pain in both feet     Impaired functional mobility, balance, and endurance      Physician: Farrukh Cleveland MD    Physician Orders: Aquatic Therapy   Medical Diagnosis from Referral: Foot pain   Evaluation Date: 1/30/2020  Authorization Period Expiration: 12/31/2019  Plan of Care Expiration: 03/13/2020  Visit # / Visits authorized: 6/ 20    Time In: 3:00 PM   Time Out: 3:35 PM  Total Billable Time: 35 minutes    Precautions: Standard    Subjective   Date of onset: March 2016; has worsened over last several months  History of current condition - Carson reports: A recent bout of physical therapy that was greatly beneficial. However, he continues to have some weakness in the hips and lower extremities and difficulty walking for long distances. His most recent episode of therapy was land based. He is most worried about his leg strength and being able to walk for a longer period of time.     12/30/20: Carson reports that overall, he feels much better. It feels like my balance is improving and I am getting stronger in my hips. I still have some pain, but it isn't as bad as it has been. I am still concerned about my left leg getting smaller.        Past Medical History:   Diagnosis Date    Hyperlipidemia     Hypertension      Carson Rivers  has no past surgical history on file.    Carson has a current medication list which includes the following prescription(s): amlodipine, aspirin, famotidine, gabapentin, labetalol, meloxicam, and spironolactone.    Review of patient's allergies indicates:  No Known Allergies     Imaging: No recent imaging     Prior Therapy: Multiple episodes of land based physical therapy for back and shoulder   Social History:  lives with their family  Occupation: Currently on disability   Prior Level of Function:  Significant limitations with walking for long distances   Current Level of Function: Some improvement in walking endurance, but still limitations. He has more difficulty walking on uneven terrain     Pain:  Current 4/10, worst 7/10, best 0/10   Location: right foot  Description: Aching  Aggravating Factors: Standing and Walking  Easing Factors: rest and elevation    Pts goals: To improve lower extremity strength and to be able to walk for longer distances     Objective   Mental status: alert, oriented x3  Posture/ Alignment: Fair, Maintains flexed posture with an increased thoracic kyphosis    GAIT DEVIATIONS: Carson amb with decreased daquan, decreased step length and decreased gait speed .    ROM:   AROM  Comment   Flexion: 90%     Extension: 50%     Lat Flex R: Joint line      Lat Flex L: Joint line      Rotation R: 100%     Rotation L: 100%     *pain    Strength: Dermatomes:   Right Left Comment   L2 intact intact    L3 intact intact    L4 intact intact    L5 intact intact    S1 intact impaired - minimum    S2 intact intact    Saddle intact intact      Myotomes:   Right Left Comment   Hip flexion (L2-3): 5/5 5/5    Knee extension (L3-4): 5/5 5/5    DF (L4-5): 5/5 4-/5    Great Toe Ext (L5-S1): 5/5 trace    Great Toe Flex (S1-S2): 5/5 trace      Hip Abductors: 5/5 bilaterally   Hip extensors: 5/5 bilaterally     DTR:   Right Left Comment   Patellar (L3-4) 0 0    Achilles (S1) 1+ 1+        Functional Tests (* indicates w/ pain)   SLS: R: 15 seconds fair balance strategies  L: 6 seconds stepping strategy to regain balance   SLR: (-) for pain   Beto test: (+) bilaterally     Palpation:  No TTP elicited     Pt/family was provided educational information, including: role of PT, goals for PT, scheduling - pt verbalized understanding. Discussed insurance plan with pt.     CMS Impairment/Limitation/Restriction for FOTO Foot Survey    Therapist reviewed FOTO scores for Carson Rivers on 1/30/2020.   FOTO documents  entered into [employer] - see Media section.    Limitation Score: 47%  Category: Mobility    Current : CK = at least 40% but < 60% impaired, limited or restricted         Home Exercises and Patient Education Provided    Education provided:   - POC  - Benefits of aquatic therapy     Written Home Exercises Provided: No HEP issued .  Exercises were reviewed and Carson was able to demonstrate them prior to the end of the session.  Carson demonstrated good  understanding of the education provided.       Assessment   Pt presents with a medical diagnosis of foot pain. Since the start of treatment, Carson has shown improvements in pain levels, balance abilities and ADL performance. However he continues to have limitations with left SLS, weakness of the dorsiflexors and varying levels of pain. He will benefit from continued treatment in an aquatic environment to better manage pain levels while allowing him to work on strength and balance deficits. This will hopefully promote a transition to a land based treatment program.     Pt prognosis is Good.   Pt will benefit from skilled outpatient Physical Therapy to address the deficits stated above and in the chart below, provide pt/family education, and to maximize pt's level of independence.     Plan of care discussed with patient: Yes  Pt's spiritual, cultural and educational needs considered and patient is agreeable to the plan of care and goals as stated below:     Anticipated Barriers for therapy: None at this time     Medical Necessity is demonstrated by the following  History  Co-morbidities and personal factors that may impact the plan of care Co-morbidities:   high BMI and prior lumbar surgery    Personal Factors:   lifestyle     moderate   Examination  Body Structures and Functions, activity limitations and participation restrictions that may impact the plan of care Body Regions:   back  lower extremities    Body Systems:    gross  symmetry  ROM  strength  balance  gait    Participation Restrictions:       Activity limitations:   Learning and applying knowledge  no deficits    General Tasks and Commands  no deficits    Communication  no deficits    Mobility  walking    Self care  washing oneself (bathing, drying, washing hands)  caring for body parts (brushing teeth, shaving, grooming)    Domestic Life  doing house work (cleaning house, washing dishes, laundry)    Interactions/Relationships  no deficits    Life Areas  employment    Community and Social Life  community life  recreation and leisure         high   Clinical Presentation evolving clinical presentation with changing clinical characteristics moderate   Decision Making/ Complexity Score: moderate     Goals:  Short Term Goals: 3 weeks   1) Pt will be I with established HEP - met 1/30/20  2) SLS on the right LE will be 10 seconds or longer to demonstrate improved balance - met 1/30/20  3) Pt will tolerate 20 minutes of walking without an increase in LBP - in progress     Long Term Goals: 6 weeks   1) Pt will improve left SLS to 5 seconds or greater to improve safety of ambulation on unlevel surfaces - met 1/30/20  2) Pain will be no worse then 4/10 during all ADL related activities - in progress   3) Carson will walk community distances on level/unlevel surfaces with pain no worse then 4/10 - in progress        Plan   Plan of care Certification: 1/30/2020 to 02/14/2020.    Outpatient Physical Therapy 2 times weekly for 6 weeks to include the following interventions: Aquatic Therapy, Manual Therapy, Neuromuscular Re-ed, Patient Education, Therapeutic Exercise and dry needling .     Jose Clark, PT

## 2020-01-30 NOTE — PROGRESS NOTES
Physical Therapy Aquatic Treatment Note     Name: Carson Rivers  Clinic Number: [id number]    Therapy Diagnosis:   Encounter Diagnoses   Name Primary?    Pain in both feet     Impaired functional mobility, balance, and endurance      Physician: Farrukh Cleveland MD  Visit Date: 1/30/2020  Physician Orders: Aquatic Therapy   Medical Diagnosis from Referral: Foot pain   Evaluation Date: 12/30/2019  Authorization Period Expiration: 12/31/2019  Plan of Care Expiration: 02/14/2020  Visit # / Visits authorized: 5/ 20     Time In: 3:00 pm  Time Out:4:00 pm  Total Billable Time: 55 minutes     Precautions: Standard      Subjective     Pt reports chava is feeling much better today then he was last visit. He does not want to participate in land based treatment today.    he was compliant with home exercise program given last session. He denies pn on this date    Pain: 6/10   Location:  back     Objective     Carson received aquatic exercises to develop strength, endurance, ROM, flexibility, posture and core stabilization for  minutes including:  Amb FWD,BWD,Side 3 min each    Not performed today  Stretches 3 x 20 sec  HSS  Quad  GSS      LE exs 20x each   Mini Squat with QS  Heel Raise with GS  Hip flex/ext  Hip ABD/ADD  Hip Circles CW/CCW  Lunges Side/FWD  SLS 3 x 30 sec  Step-ups    Endurance 3 min  Marching    Home Exercises Provided and Patient Education Provided     Education provided:   - progress towards goals   - role of therapy     Written Home Exercises Provided: Patient was not issued HEP for pool.  Exercises were reviewed and Carson was able to demonstrate them prior to the end of the session.   Pt received a written copy of exercises to perform at home.     Carson demonstrated good  understanding of the education provided.     Assessment   Carson has shown improvements in balance, ADL abilities and functional mobility. However, he remains limited. He will benefit from continued participation in aquatic therapy to  allow him to exercise with reduced pain and promote a transition to land based treatment.      Carson is progressing poor  towards his goals today.   Pt prognosis is Good.     Pt will continue to benefit from skilled outpatient physical therapy to address the deficits listed in the problem list box on initial evaluation, provide pt/family education and to maximize pt's level of independence in the home and community environment.     Pt's spiritual, cultural and educational needs considered and pt agreeable to plan of care and goals.    Anticipated barriers to physical therapy: none    Goals:     Short Term Goals: 3 weeks   1) Pt will be I with established HEP   2) SLS on the right LE will be 10 seconds or longer to demonstrate improved balance   3) Pt will tolerate 20 minutes of walking without an increase in LBP      Long Term Goals: 6 weeks   1) Pt will improve left SLS to 5 seconds or greater to improve safety of ambulation on unlevel surfaces  2) Pain will be no worse then 4/10 during all ADL related activities   3) Carson will walk community distances on level/unlevel surfaces with pain no worse then 4/10    Plan     Pt to be re-assessed by PT.      Jose Clark, PT

## 2020-02-12 ENCOUNTER — CLINICAL SUPPORT (OUTPATIENT)
Dept: REHABILITATION | Facility: HOSPITAL | Age: 50
End: 2020-02-12
Payer: MEDICARE

## 2020-02-12 DIAGNOSIS — M79.672 PAIN IN BOTH FEET: ICD-10-CM

## 2020-02-12 DIAGNOSIS — M79.671 PAIN IN BOTH FEET: ICD-10-CM

## 2020-02-12 DIAGNOSIS — Z74.09 IMPAIRED FUNCTIONAL MOBILITY, BALANCE, AND ENDURANCE: ICD-10-CM

## 2020-02-12 PROCEDURE — 97113 AQUATIC THERAPY/EXERCISES: CPT | Mod: PO,CQ

## 2020-02-12 NOTE — PROGRESS NOTES
Physical Therapy Aquatic Treatment Note     Name: Carson Rivers  Clinic Number: [id number]    Therapy Diagnosis:   Encounter Diagnoses   Name Primary?    Pain in both feet     Impaired functional mobility, balance, and endurance      Physician: Farrukh Cleveland MD  Visit Date: 2/12/2020  Physician Orders: Aquatic Therapy   Medical Diagnosis from Referral: Foot pain   Evaluation Date: 12/30/2019  Authorization Period Expiration: 12/31/2019  Plan of Care Expiration: 02/14/2020  Visit # / Visits authorized: 6/ 20     Time In: 3:55 pm  Time Out:5:00 pm  Total Billable Time: 60 minutes     Precautions: Standard      Subjective     Pt reports that he has not done much in a week.He reports that he did tie his shoe w/o sitting down for the first time in a long time. He requests to not perform stretching exs and focus more on walking today.   he was compliant with home exercise program given last session.     Pain: 5/10   Location:  back     Objective     Carson received aquatic exercises to develop strength, endurance, ROM, flexibility, posture and core stabilization for  minutes including:  Amb FWD,BWD,Side 4 min each      Stretches 3 x 20 sec Not performed today per pt request  HSS  Quad  GSS      LE exs 20x each 1.5#  Mini Squat with QS  Heel Raise with GS  Hip flex/ext  Hip ABD/ADD  Hip Circles CW/CCW  Lunges Side/FWD  SLS 3 x 30 sec  Step-ups    Endurance 3 min  Marching    Home Exercises Provided and Patient Education Provided     Education provided:   - progress towards goals   - role of therapy     Written Home Exercises Provided: Patient was not issued HEP for pool.  Exercises were reviewed and Carson was able to demonstrate them prior to the end of the session.   Pt received a written copy of exercises to perform at home.     Carson demonstrated good  understanding of the education provided.     Assessment   Carson has shown improvements in balance and now demonstrates good SLS bal in pool  He mayela progression of  resistance with ther ex well w/o increase in LBP sx. He reports improvement with  ADL abilities and functional mobility.  He will benefit from continued participation in aquatic therapy to allow him to exercise with reduced pain and promote a transition to land based treatment.      Carson is progressing poor  towards his goals today.   Pt prognosis is Good.     Pt will continue to benefit from skilled outpatient physical therapy to address the deficits listed in the problem list box on initial evaluation, provide pt/family education and to maximize pt's level of independence in the home and community environment.     Pt's spiritual, cultural and educational needs considered and pt agreeable to plan of care and goals.    Anticipated barriers to physical therapy: none    Goals:     Short Term Goals: 3 weeks   1) Pt will be I with established HEP   2) SLS on the right LE will be 10 seconds or longer to demonstrate improved balance   3) Pt will tolerate 20 minutes of walking without an increase in LBP      Long Term Goals: 6 weeks   1) Pt will improve left SLS to 5 seconds or greater to improve safety of ambulation on unlevel surfaces  2) Pain will be no worse then 4/10 during all ADL related activities   3) Carson will walk community distances on level/unlevel surfaces with pain no worse then 4/10    Plan     Continue tx 2x week      Ruperto Eng, [place]

## 2020-02-14 ENCOUNTER — CLINICAL SUPPORT (OUTPATIENT)
Dept: REHABILITATION | Facility: HOSPITAL | Age: 50
End: 2020-02-14
Payer: MEDICARE

## 2020-02-14 DIAGNOSIS — Z74.09 IMPAIRED FUNCTIONAL MOBILITY, BALANCE, AND ENDURANCE: ICD-10-CM

## 2020-02-14 DIAGNOSIS — M79.672 PAIN IN BOTH FEET: ICD-10-CM

## 2020-02-14 DIAGNOSIS — M79.671 PAIN IN BOTH FEET: ICD-10-CM

## 2020-02-14 PROCEDURE — 97113 AQUATIC THERAPY/EXERCISES: CPT | Mod: PO,CQ

## 2020-02-14 NOTE — PROGRESS NOTES
Physical Therapy Aquatic Treatment Note     Name: Carson Rivers  Clinic Number: [id number]    Therapy Diagnosis:   Encounter Diagnoses   Name Primary?    Pain in both feet     Impaired functional mobility, balance, and endurance      Physician: Farrukh Cleveland MD  Visit Date: 2/14/2020  Physician Orders: Aquatic Therapy   Medical Diagnosis from Referral: Foot pain   Evaluation Date: 12/30/2019  Authorization Period Expiration: 12/31/2019  Plan of Care Expiration: 02/14/2020  Visit # / Visits authorized: 7/ 20     Time In: 3:10 pm  Time Out:4:10 pm  Total Billable Time: 50 minutes     Precautions: Standard      Subjective     Pt reports that his back is not really hurting today but his feet are 6/10. Carson states that he did some heel raises at home and then was on his feet for a long time when his feet began to hurt.   he was compliant with home exercise program given last session.     Pain: 6/10   Location:  B feet     Objective     Carson received aquatic exercises to develop strength, endurance, ROM, flexibility, posture and core stabilization for  minutes including:  Amb FWD,BWD,Side 4 min each      Stretches 3 x 20 sec Not performed today per pt request  HSS  Quad  GSS      LE exs 20x each 2#  Mini Squat with QS  Heel Raise with GS (requested to not do heel raise today due to foot pn)  Hip flex/ext  Hip ABD/ADD  Hip Circles CW/CCW  Lunges Side/FWD  SLS 3 x 30 sec  Step-ups    Endurance 2 min  Bicycle (stopped due to cramping)    Home Exercises Provided and Patient Education Provided     Education provided:   - progress towards goals   - role of therapy     Written Home Exercises Provided: Patient was not issued HEP for pool.  Exercises were reviewed and Carson was able to demonstrate them prior to the end of the session.   Pt received a written copy of exercises to perform at home.     Carson demonstrated good  understanding of the education provided.     Assessment   Carson with decreased mayela to ther ex today  due to B foot pn and B HS cramping. He continues to request progression of amb activities but unable today due to above.  He continues to reports improvement with  ADL abilities and functional mobility.  He will benefit from continued participation in aquatic therapy to allow him to exercise with reduced pain and promote a transition to land based treatment.      Carson is progressing poor  towards his goals today.   Pt prognosis is Good.     Pt will continue to benefit from skilled outpatient physical therapy to address the deficits listed in the problem list box on initial evaluation, provide pt/family education and to maximize pt's level of independence in the home and community environment.     Pt's spiritual, cultural and educational needs considered and pt agreeable to plan of care and goals.    Anticipated barriers to physical therapy: none    Goals:     Short Term Goals: 3 weeks   1) Pt will be I with established HEP   2) SLS on the right LE will be 10 seconds or longer to demonstrate improved balance   3) Pt will tolerate 20 minutes of walking without an increase in LBP      Long Term Goals: 6 weeks   1) Pt will improve left SLS to 5 seconds or greater to improve safety of ambulation on unlevel surfaces  2) Pain will be no worse then 4/10 during all ADL related activities   3) Carson will walk community distances on level/unlevel surfaces with pain no worse then 4/10    Plan     Continue tx 2x week      Ruperto Eng PTA

## 2020-02-21 ENCOUNTER — CLINICAL SUPPORT (OUTPATIENT)
Dept: REHABILITATION | Facility: HOSPITAL | Age: 50
End: 2020-02-21
Payer: MEDICARE

## 2020-02-21 DIAGNOSIS — M79.672 PAIN IN BOTH FEET: ICD-10-CM

## 2020-02-21 DIAGNOSIS — M79.671 PAIN IN BOTH FEET: ICD-10-CM

## 2020-02-21 DIAGNOSIS — Z74.09 IMPAIRED FUNCTIONAL MOBILITY, BALANCE, AND ENDURANCE: ICD-10-CM

## 2020-02-21 PROCEDURE — 97113 AQUATIC THERAPY/EXERCISES: CPT | Mod: PO,CQ

## 2020-02-21 NOTE — PROGRESS NOTES
Physical Therapy Aquatic Treatment Note     Name: Carson Rivers  Clinic Number: [id number]    Therapy Diagnosis:   Encounter Diagnoses   Name Primary?    Pain in both feet     Impaired functional mobility, balance, and endurance      Physician: Farrukh Cleveland MD  Visit Date: 2/21/2020  Physician Orders: Aquatic Therapy   Medical Diagnosis from Referral: Foot pain   Evaluation Date: 12/30/2019  Authorization Period Expiration: 12/31/2019  Plan of Care Expiration: 02/14/2020  Visit # / Visits authorized: 10/ 20     Time In: 2:00 pm  Time Out:2:50 pm  Total Billable Time: 50 minutes     Precautions: Standard      Subjective     Pt reports again denies  back pn b0ut his feet are 5/10. .   he was compliant with home exercise program given last session.     Pain: 6/10   Location:  B feet     Objective     Carson received aquatic exercises to develop strength, endurance, ROM, flexibility, posture and core stabilization for  minutes including:  Amb FWD,BWD,Side 4 min each      Stretches 3 x 20 sec Not performed today per pt request due to B plantar pn  HSS  Quad  GSS      LE exs 20x each 2#  Mini Squat with QS  Heel Raise with GS (requested to not do heel raise today due to foot pn)  Hip flex/ext  Hip ABD/ADD  Hip Circles CW/CCW  Lunges Side/FWD  SLS 3 x 30 sec NP due to foot pn  Step-ups    Endurance 2 min  Bicycle (stopped due to cramping)    Home Exercises Provided and Patient Education Provided     Education provided:   - progress towards goals   - role of therapy     Written Home Exercises Provided: Patient was not issued HEP for pool.  Exercises were reviewed and Carson was able to demonstrate them prior to the end of the session.   Pt received a written copy of exercises to perform at home.     Carson demonstrated good  understanding of the education provided.     Assessment   Carson with decreased mayela to ther ex  again today due to B foot pn He was w/o increase in c/o pn activities he did perform.   He will benefit  from continued participation in aquatic therapy to allow him to exercise with reduced pain and promote a transition to land based treatment.      Carson is progressing poor  towards his goals today.   Pt prognosis is Good.     Pt will continue to benefit from skilled outpatient physical therapy to address the deficits listed in the problem list box on initial evaluation, provide pt/family education and to maximize pt's level of independence in the home and community environment.     Pt's spiritual, cultural and educational needs considered and pt agreeable to plan of care and goals.    Anticipated barriers to physical therapy: none    Goals:     Short Term Goals: 3 weeks   1) Pt will be I with established HEP   2) SLS on the right LE will be 10 seconds or longer to demonstrate improved balance   3) Pt will tolerate 20 minutes of walking without an increase in LBP      Long Term Goals: 6 weeks   1) Pt will improve left SLS to 5 seconds or greater to improve safety of ambulation on unlevel surfaces  2) Pain will be no worse then 4/10 during all ADL related activities   3) Carson will walk community distances on level/unlevel surfaces with pain no worse then 4/10    Plan     Pt to be reassessed by PT next visit    Ruperto Eng PTA

## 2020-03-02 ENCOUNTER — CLINICAL SUPPORT (OUTPATIENT)
Dept: REHABILITATION | Facility: HOSPITAL | Age: 50
End: 2020-03-02
Payer: MEDICARE

## 2020-03-02 DIAGNOSIS — Z74.09 IMPAIRED FUNCTIONAL MOBILITY, BALANCE, AND ENDURANCE: ICD-10-CM

## 2020-03-02 DIAGNOSIS — M79.672 PAIN IN BOTH FEET: ICD-10-CM

## 2020-03-02 DIAGNOSIS — M79.671 PAIN IN BOTH FEET: ICD-10-CM

## 2020-03-02 PROCEDURE — 97110 THERAPEUTIC EXERCISES: CPT | Mod: PO | Performed by: PHYSICAL THERAPIST

## 2020-03-02 NOTE — TELEPHONE ENCOUNTER
----- Message from Carroll Antunez MD sent at 4/3/2019  4:47 AM CDT -----  Lipids reviewed continue current meds  
The patient did not show for his Nuclear stress.He was not able to make it and stated he called 3 times and could not get an answer.He asked to reschedule.The next available is 4/17 and its his anniversary and he did not want to do test that day. I will reschedule and mail to patient new morenita [email or web address] offered Khang or Piter Maxwell and he stated no he wanted xiao.The patient has follow up in June so will set up test before that time.  
The patient was notified of lab results and echo results.He verbalized understanding.  
alert, responds verbally, no ataxia ongait assessment, normal strength in all extremities

## 2020-03-02 NOTE — PLAN OF CARE
[facility]  Physical Therapy Reassessment    Name: Carson Rivers  Clinic Number: [id number]    Therapy Diagnosis:   No diagnosis found.  Physician: Farrukh Cleveland MD    Physician Orders: Aquatic Therapy   Medical Diagnosis from Referral: Foot pain   Evaluation Date: 3/2/2020  Authorization Period Expiration: 12/31/2019  Plan of Care Expiration: 04/13/2020  Visit # / Visits authorized: 12/ 20    Time In: 11:00 AM   Time Out:11:20 AM  Total Billable Time: 20 minutes    Precautions: Standard    Subjective   Date of onset: March 2016; has worsened over last several months  History of current condition - Carson reports: A recent bout of physical therapy that was greatly beneficial. However, he continues to have some weakness in the hips and lower extremities and difficulty walking for long distances. His most recent episode of therapy was land based. He is most worried about his leg strength and being able to walk for a longer period of time.     12/30/20: Carson reports that overall, he feels much better. It feels like my balance is improving and I am getting stronger in my hips. I still have some pain, but it isn't as bad as it has been. I am still concerned about my left leg getting smaller.     3/2/20: Carson reports that he has been able to tell a significant difference since starting aquatic therapy. I am able to regain my balance when I become off balance. I am also walking better on unlevel surfaces. I want to continue therapy and transition to a land based program to help me move towards a gym based setting.          Past Medical History:   Diagnosis Date    Hyperlipidemia     Hypertension      Carson Rivers  has no past surgical history on file.    Carson has a current medication list which includes the following prescription(s): amlodipine, aspirin, famotidine, gabapentin, labetalol, meloxicam, and spironolactone.    Review of patient's allergies indicates:  No Known Allergies      Imaging: No recent imaging     Prior Therapy: Multiple episodes of land based physical therapy for back and shoulder   Social History:  lives with their family  Occupation: Currently on disability   Prior Level of Function: Significant limitations with walking for long distances   Current Level of Function: Some improvement in walking endurance, but still limitations. He has more difficulty walking on uneven terrain     Pain:  Current 0/10, worst 7/10, best 0/10   Location: right foot  Description: Aching  Aggravating Factors: Standing and Walking  Easing Factors: rest and elevation    Pts goals: To improve lower extremity strength and to be able to walk for longer distances     Objective   Mental status: alert, oriented x3  Posture/ Alignment: Fair, Maintains flexed posture with an increased thoracic kyphosis    GAIT DEVIATIONS: Carson amb with decreased daquan, decreased step length and decreased gait speed .    ROM:   AROM  Comment   Flexion: 100%     Extension: 50%     Lat Flex R: Joint line      Lat Flex L: Joint line      Rotation R: 100%     Rotation L: 100%     *pain    Strength: Dermatomes:   Right Left Comment   L2 intact intact    L3 intact intact    L4 intact intact    L5 intact intact    S1 intact impaired - minimum    S2 intact intact    Saddle intact intact      Myotomes:   Right Left Comment   Hip flexion (L2-3): 5/5 5/5    Knee extension (L3-4): 5/5 5/5    DF (L4-5): 5/5 4-/5    Great Toe Ext (L5-S1): 5/5 trace    Great Toe Flex (S1-S2): 5/5 trace      Hip Abductors: 5/5 bilaterally   Hip extensors: 5/5 bilaterally     DTR:   Right Left Comment   Patellar (L3-4) 0 0    Achilles (S1) 1+ 1+        Functional Tests (* indicates w/ pain)   SLS: R: 20 seconds fair balance strategies  L: 6 seconds stepping strategy to regain balance   SLR: (-) for pain   Beto test: (+) bilaterally     Palpation:  No TTP elicited     Pt/family was provided educational information, including: role of PT, goals for  PT, scheduling - pt verbalized understanding. Discussed insurance plan with pt.     CMS Impairment/Limitation/Restriction for FOTO Foot Survey    Therapist reviewed FOTO scores for Carson Rivers on 3/2/2020.   FOTO documents entered into [employer] - see Media section.    Limitation Score: 38%  Category: Mobility    Current : CJ = at least 20% but < 40% impaired, limited or restricted         Home Exercises and Patient Education Provided    Education provided:   - POC  - Benefits of aquatic therapy     Written Home Exercises Provided: No HEP issued .  Exercises were reviewed and Carson was able to demonstrate them prior to the end of the session.  Carson demonstrated good  understanding of the education provided.       Assessment   Pt presents with a medical diagnosis of foot pain. Since the start of treatment, Carson has shown improvements in pain levels, balance abilities and ADL performance. However he continues to have limitations with left SLS, weakness of the dorsiflexors and varying levels of pain. He will benefit from an additional 2 weeks of aquatic therapy and then transition him to a land based program to promote a return to exercise in a gym based setting and improve ADL abilities.     Pt prognosis is Good.   Pt will benefit from skilled outpatient Physical Therapy to address the deficits stated above and in the chart below, provide pt/family education, and to maximize pt's level of independence.     Plan of care discussed with patient: Yes  Pt's spiritual, cultural and educational needs considered and patient is agreeable to the plan of care and goals as stated below:     Anticipated Barriers for therapy: None at this time     Medical Necessity is demonstrated by the following  History  Co-morbidities and personal factors that may impact the plan of care Co-morbidities:   high BMI and prior lumbar surgery    Personal Factors:   lifestyle     moderate   Examination  Body Structures and Functions, activity  limitations and participation restrictions that may impact the plan of care Body Regions:   back  lower extremities    Body Systems:    gross symmetry  ROM  strength  balance  gait    Participation Restrictions:       Activity limitations:   Learning and applying knowledge  no deficits    General Tasks and Commands  no deficits    Communication  no deficits    Mobility  walking    Self care  washing oneself (bathing, drying, washing hands)  caring for body parts (brushing teeth, shaving, grooming)    Domestic Life  doing house work (cleaning house, washing dishes, laundry)    Interactions/Relationships  no deficits    Life Areas  employment    Community and Social Life  community life  recreation and leisure         high   Clinical Presentation evolving clinical presentation with changing clinical characteristics moderate   Decision Making/ Complexity Score: moderate     Goals:  Short Term Goals: 3 weeks   1) Pt will be I with established HEP - met 1/30/20  2) SLS on the right LE will be 10 seconds or longer to demonstrate improved balance - met 1/30/20  3) Pt will tolerate 20 minutes of walking without an increase in LBP - in progress     Long Term Goals: 6 weeks   1) Pt will improve left SLS to 5 seconds or greater to improve safety of ambulation on unlevel surfaces - met 1/30/20  2) Pain will be no worse then 4/10 during all ADL related activities - in progress   3) Carson will walk community distances on level/unlevel surfaces with pain no worse then 4/10 - in progress  4) Pt will maintain L SLS for 10 seconds   5) Pt will return to exercising in a gym based setting withou 2/10 pain or less         Plan   Plan of care Certification: 3/2/2020 to 04/13/2020.    Outpatient Physical Therapy 2 times weekly for 6 weeks to include the following interventions: Aquatic Therapy, Manual Therapy, Neuromuscular Re-ed, Patient Education, Therapeutic Exercise and dry needling .     Jose Clark, PT

## 2020-03-11 ENCOUNTER — CLINICAL SUPPORT (OUTPATIENT)
Dept: REHABILITATION | Facility: HOSPITAL | Age: 50
End: 2020-03-11
Payer: MEDICARE

## 2020-03-11 DIAGNOSIS — Z74.09 IMPAIRED FUNCTIONAL MOBILITY, BALANCE, AND ENDURANCE: ICD-10-CM

## 2020-03-11 DIAGNOSIS — M79.671 PAIN IN BOTH FEET: ICD-10-CM

## 2020-03-11 DIAGNOSIS — M79.672 PAIN IN BOTH FEET: ICD-10-CM

## 2020-03-11 PROCEDURE — 97113 AQUATIC THERAPY/EXERCISES: CPT | Mod: PO,CQ

## 2020-03-11 NOTE — PROGRESS NOTES
Physical Therapy Aquatic Treatment Note     Name: Carson Rivers  Clinic Number: [id number]    Therapy Diagnosis:   Encounter Diagnoses   Name Primary?    Pain in both feet     Impaired functional mobility, balance, and endurance      Physician: Farrukh Cleveland MD  Visit Date: 3/11/2020  Physician Orders: Aquatic Therapy   Medical Diagnosis from Referral: Foot pain   Evaluation Date: 12/30/2019  Authorization Period Expiration: 12/31/2019  Plan of Care Expiration: 02/14/2020  Visit # / Visits authorized: 12/ 20     Time In: 4:08 pm  Time Out:5:00 pm  Total Billable Time: 50 minutes     Precautions: Standard      Subjective     Pt reports 3/10 B foot pn today. .   he was compliant with home exercise program given last session.     Pain: 3/10   Location:  B feet     Objective     Carson received aquatic exercises to develop strength, endurance, ROM, flexibility, posture and core stabilization for  minutes including:  Amb FWD,BWD,Side 4 min each      Stretches 3 x 20 sec   HSS  Quad  GSS      LE exs 20x each 2#  Mini Squat with QS  Heel Raise with GS   Hip flex/ext  Hip ABD/ADD  Hip Circles CW/CCW  Lunges Side/FWD  SLS 3 x 30 sec   Step-ups    Endurance 2 min NP  Bicycle     Home Exercises Provided and Patient Education Provided     Education provided:   - progress towards goals   - role of therapy     Written Home Exercises Provided: Patient was not issued HEP for pool.  Exercises were reviewed and Carson was able to demonstrate them prior to the end of the session.   Pt received a written copy of exercises to perform at home.     Carson demonstrated good  understanding of the education provided.     Assessment   Carson was able to return to performing previous level of ther ex  today w/o provocation of foot pn.  He was w/o c/o increase  pn activities during and post tx today..  His bal continues to be issue but it does improve when he follows VCs for proper posture and core stab tech.  He will benefit from continued  participation in aquatic therapy to allow him to exercise with reduced pain and promote a transition to land based treatment.      Carson is progressing poor  towards his goals today.   Pt prognosis is Good.     Pt will continue to benefit from skilled outpatient physical therapy to address the deficits listed in the problem list box on initial evaluation, provide pt/family education and to maximize pt's level of independence in the home and community environment.     Pt's spiritual, cultural and educational needs considered and pt agreeable to plan of care and goals.    Anticipated barriers to physical therapy: none    Goals:     Short Term Goals: 3 weeks   1) Pt will be I with established HEP   2) SLS on the right LE will be 10 seconds or longer to demonstrate improved balance   3) Pt will tolerate 20 minutes of walking without an increase in LBP      Long Term Goals: 6 weeks   1) Pt will improve left SLS to 5 seconds or greater to improve safety of ambulation on unlevel surfaces  2) Pain will be no worse then 4/10 during all ADL related activities   3) Carson will walk community distances on level/unlevel surfaces with pain no worse then 4/10    Plan     Pt to be reassessed by PT next visit    Ruperto Eng PTA

## 2020-03-18 ENCOUNTER — DOCUMENTATION ONLY (OUTPATIENT)
Dept: REHABILITATION | Facility: HOSPITAL | Age: 50
End: 2020-03-18

## 2020-03-18 ENCOUNTER — CLINICAL SUPPORT (OUTPATIENT)
Dept: REHABILITATION | Facility: HOSPITAL | Age: 50
End: 2020-03-18
Payer: MEDICARE

## 2020-03-18 DIAGNOSIS — Z74.09 IMPAIRED FUNCTIONAL MOBILITY, BALANCE, AND ENDURANCE: ICD-10-CM

## 2020-03-18 DIAGNOSIS — M79.672 PAIN IN BOTH FEET: ICD-10-CM

## 2020-03-18 DIAGNOSIS — M79.671 PAIN IN BOTH FEET: ICD-10-CM

## 2020-03-18 PROCEDURE — 97113 AQUATIC THERAPY/EXERCISES: CPT | Mod: PO,CQ

## 2020-03-18 NOTE — PROGRESS NOTES
Physical Therapy Aquatic Treatment Note     Name: Carson Rivers  Clinic Number: [id number]    Therapy Diagnosis:   Encounter Diagnoses   Name Primary?    Pain in both feet     Impaired functional mobility, balance, and endurance      Physician: Farrukh Cleveland MD  Visit Date: 3/18/2020  Physician Orders: Aquatic Therapy   Medical Diagnosis from Referral: Foot pain   Evaluation Date: 12/30/2019  Authorization Period Expiration: 12/31/2019  Plan of Care Expiration: 02/14/2020  Visit # / Visits authorized: 15/ 20     Time In: 4:00 pm  Time Out:5:00 pm  Total Billable Time: 60 minutes     Precautions: Standard      Subjective     Pt reports 0/10  pn today. .He states that his feet have been hurting less since getting new orthopedic shoes. Spoke with patient due to therapy following updates regarding COVID-19 closely and taking every precaution to ensure the safety of our patients, staff and community. In an abundance of caution and in an effort to help reduce risk and limit community spread, we have decided to temporarily postpone appointments for patients who may be at increased risk to attend in-person therapy or where therapy is not critically needed at this time. Plan of care and home exercise program were reviewed and patient has what they need to continue therapy at home. All patient questions were answered. Also stated to patient that we are exploring virtual methods of providing care and will be in touch over the next few weeks. Patient verbalized understanding to all     he was compliant with home exercise program given last session.     Pain: 0/10   Location:  B feet     Objective     Carson received aquatic exercises to develop strength, endurance, ROM, flexibility, posture and core stabilization for  minutes including:  Amb FWD,BWD,Side 4 min each      Stretches 3 x 20 sec   HSS  Quad  GSS      LE exs 20x each 2#  Mini Squat with QS  Heel Raise with GS   Hip flex/ext  Hip ABD/ADD  Hip Circles  CW/CCW  Lunges Side/FWD  SLS 3 x 30 sec   Step-ups    Endurance 2 min NP  Bicycle     Home Exercises Provided and Patient Education Provided     Education provided:   - progress towards goals   - role of therapy     Written Home Exercises Provided: Patient was not issued HEP for pool.  Exercises were reviewed and Carson was able to demonstrate them prior to the end of the session.   Pt received a written copy of exercises to perform at home.     Carson demonstrated good  understanding of the education provided.     Assessment   Carson was able to perform all ther ex  today w/o provocation of foot pn.  He was w/o c/o  pn throughout session with all activities during and post tx today..  His bal continues to  improve when he follows VCs for proper posture and core stab tech.     Carson is progressing fair  towards his goals today.   Pt prognosis is Good.     Pt's spiritual, cultural and educational needs considered and pt agreeable to plan of care and goals.    Anticipated barriers to physical therapy: none    Goals:     Short Term Goals: 3 weeks   1) Pt will be I with established HEP   2) SLS on the right LE will be 10 seconds or longer to demonstrate improved balance   3) Pt will tolerate 20 minutes of walking without an increase in LBP      Long Term Goals: 6 weeks   1) Pt will improve left SLS to 5 seconds or greater to improve safety of ambulation on unlevel surfaces  2) Pain will be no worse then 4/10 during all ADL related activities   3) Carson will walk community distances on level/unlevel surfaces with pain no worse then 4/10    Plan     Pt's therapy has been suspended due to Covid 19 precautions and will resume when appropriate.    Ruperto Eng, PTA

## 2020-03-25 ENCOUNTER — TELEPHONE (OUTPATIENT)
Dept: CARDIOLOGY | Facility: CLINIC | Age: 50
End: 2020-03-25

## 2020-03-25 NOTE — TELEPHONE ENCOUNTER
Pt returned my call. Does not want to see surgeon, wants to see a vascular dr in [place] since we do not have one in [place].  Told him I will work on that. I asked him to work on getting his [facility] portal set up and send me a message so I can change his appt to a virtual visit. He agreed. cm

## 2020-03-25 NOTE — TELEPHONE ENCOUNTER
----- Message from Mary Anne Cabello MA sent at 3/25/2020  9:35 AM CDT -----  Contact: self [phone]  Patient would like to know what vascular doctor do Dr. Antunez refer his patient to go and see.  He is having some problems with the veins in his legs.  Please call.  Thanks

## 2020-03-26 ENCOUNTER — TELEPHONE (OUTPATIENT)
Dept: CARDIOLOGY | Facility: CLINIC | Age: 50
End: 2020-03-26

## 2020-03-26 NOTE — TELEPHONE ENCOUNTER
Pt agreed for me to send request for appt to Dr Valdez in [place]. Cm  Called and NA. Cm  Called home number, not available. Cm  Called and voicemail not set up. Cm  ----- Message from Carroll Antunez MD sent at 3/26/2020  6:54 AM CDT -----  Contact: self [phone]  Dr. Valdez  ----- Message -----  From: Nicol Ling LPN  Sent: 3/25/2020  10:35 AM CDT  To: Carroll Antunez MD        ----- Message -----  From: Mary Anne Cabello MA  Sent: 3/25/2020   9:35 AM CDT  To: Tulio RAYMOND Staff    Patient would like to know what vascular doctor do Dr. Antunez refer his patient to go and see.  He is having some problems with the veins in his legs.  Please call.  Thanks

## 2020-03-27 ENCOUNTER — TELEPHONE (OUTPATIENT)
Dept: CARDIOLOGY | Facility: CLINIC | Age: 50
End: 2020-03-27

## 2020-03-27 NOTE — TELEPHONE ENCOUNTER
Per Dr Antunez , see Dr Huggins in .  [facility] in [place] not accepting any new patients. -Called pt and got recording, voice mail not set up. Cm  ---- Message from Nicol Ling LPN sent at 3/25/2020  1:31 PM CDT -----  Set up appt with vascular dr and change appt date and make v v appt. cm

## 2020-05-19 ENCOUNTER — CLINICAL SUPPORT (OUTPATIENT)
Dept: REHABILITATION | Facility: HOSPITAL | Age: 50
End: 2020-05-19
Payer: MEDICARE

## 2020-05-19 DIAGNOSIS — M79.672 PAIN IN BOTH FEET: ICD-10-CM

## 2020-05-19 DIAGNOSIS — Z74.09 IMPAIRED FUNCTIONAL MOBILITY, BALANCE, AND ENDURANCE: ICD-10-CM

## 2020-05-19 DIAGNOSIS — M79.671 PAIN IN BOTH FEET: ICD-10-CM

## 2020-05-19 PROCEDURE — 97164 PT RE-EVAL EST PLAN CARE: CPT | Mod: PO

## 2020-05-19 NOTE — PLAN OF CARE
[facility]  Physical Therapy Reassessment    Name: Carson Rivers  Clinic Number: [id number]    Therapy Diagnosis:   Encounter Diagnoses   Name Primary?    Pain in both feet     Impaired functional mobility, balance, and endurance      Physician: Farrukh Cleveland MD    Visit Date: 3/18/2020  Physician Orders: Aquatic Therapy   Medical Diagnosis from Referral: Foot pain   Evaluation Date: 12/30/2019  Authorization Period Expiration: 12/31/2019  Plan of Care Expiration: 07/17/2020  Visit # / Visits authorized: 15/ 20    Time In: 1404   Time Out: 1424  Total Billable Time: 20 minutes    Precautions: Standard    Subjective   Date of onset: March 2016; has worsened over last several months  History of current condition - Casron reports: A recent bout of physical therapy that was greatly beneficial. However, he continues to have some weakness in the hips and lower extremities and difficulty walking for long distances. His most recent episode of therapy was land based. He is most worried about his leg strength and being able to walk for a longer period of time.     12/30/20: Carson reports that overall, he feels much better. It feels like my balance is improving and I am getting stronger in my hips. I still have some pain, but it isn't as bad as it has been. I am still concerned about my left leg getting smaller.     3/2/20: Carson reports that he has been able to tell a significant difference since starting aquatic therapy. I am able to regain my balance when I become off balance. I am also walking better on unlevel surfaces. I want to continue therapy and transition to a land based program to help me move towards a gym based setting.     05/19/20: Carson reports he feels like he has regressed since his last PT treatment. He reports he feels like his hips are weaker. He states his foot pain 6/10 is about a 6/10 He has not been able to get into the pool because the weather has been to cold. He got  new shoes, which has helped with his pain.       Past Medical History:   Diagnosis Date    Hyperlipidemia     Hypertension      Carson Rivers  has no past surgical history on file.    Carson has a current medication list which includes the following prescription(s): amlodipine, aspirin, famotidine, gabapentin, labetalol, meloxicam, and spironolactone.    Review of patient's allergies indicates:  No Known Allergies     Imaging: No recent imaging     Prior Therapy: Multiple episodes of land based physical therapy for back and shoulder   Social History:  lives with their family  Occupation: Currently on disability   Prior Level of Function: Significant limitations with walking for long distances   Current Level of Function: Some improvement in walking endurance, but still limitations. He has more difficulty walking on uneven terrain     Pain:  Current 0/10, worst 7/10, best 0/10   Location: right foot  Description: Aching  Aggravating Factors: Standing and Walking  Easing Factors: rest and elevation    Pts goals: To improve lower extremity strength and to be able to walk for longer distances     Objective   Mental status: alert, oriented x3  Posture/ Alignment: Fair, Maintains flexed posture with an increased thoracic kyphosis    GAIT DEVIATIONS: Carson amb with decreased daquan, decreased step length and decreased gait speed .    ROM:   AROM % Limiation Comment   Flexion: 10%     Extension: 50%     Lat Flex R: Joint line      Lat Flex L: Joint line      Rotation R: 50%     Rotation L: 50%     *pain    Strength: Dermatomes:   Right Left Comment   L2 intact intact    L3 intact intact    L4 intact intact    L5 intact intact    S1 intact impaired - minimum    S2 intact intact    Saddle intact intact      Myotomes:   Right Left Comment   Hip flexion (L2-3): 5/5 5/5    Knee extension (L3-4): 5/5 5/5    DF (L4-5): 5/5 4-/5    Great Toe Ext (L5-S1): 5/5 trace    Great Toe Flex (S1-S2): 5/5 trace      Hip Abductors: 5/5  bilaterally   Hip extensors: 5/5 bilaterally     DTR:   Right Left Comment   Patellar (L3-4) 0 0    Achilles (S1) 1+ 1+        Functional Tests (* indicates w/ pain)   SLS: R: 6 seconds fair balance strategies  L: 2 seconds stepping strategy to regain balance   SLR: (-) for pain   Beto test: (+) bilaterally     Palpation:  No TTP elicited     Pt/family was provided educational information, including: role of PT, goals for PT, scheduling - pt verbalized understanding. Discussed insurance plan with pt.     CMS Impairment/Limitation/Restriction for FOTO Foot Survey    Therapist reviewed FOTO scores for Carson Rivers on 5/19/2020.   FOTO documents entered into [employer] - see Media section.    Limitation Score: 54%  Category: Mobility    Current : CJ = at least 20% but < 40% impaired, limited or restricted         Home Exercises and Patient Education Provided    Education provided:   - POC  - Benefits of aquatic therapy     Written Home Exercises Provided: No HEP issued .  Exercises were reviewed and Carson was able to demonstrate them prior to the end of the session.  Carson demonstrated good  understanding of the education provided.       Assessment   Pt presents with a medical diagnosis of foot pain. Since beginning PT, Carson has been seen 15 times since initial evaluation on 12/30/2019. Carson was progressing well with therapy and about to transition to a land based program prior to COVID-19 limiting his ability to participate with PT. Decreased lumbar ROM noted. His SL balance has regressed and pain has increased with prolonged time between PT treatments and reports he has to move slowly to avoid any falls. Will plan to start PT in the pool initially and progress to land based therapy as appropriate.     Pt prognosis is Good.   Pt will benefit from skilled outpatient Physical Therapy to address the deficits stated above and in the chart below, provide pt/family education, and to maximize pt's level of independence.      Plan of care discussed with patient: Yes  Pt's spiritual, cultural and educational needs considered and patient is agreeable to the plan of care and goals as stated below:     Anticipated Barriers for therapy: None at this time     Medical Necessity is demonstrated by the following  History  Co-morbidities and personal factors that may impact the plan of care Co-morbidities:   high BMI and prior lumbar surgery    Personal Factors:   lifestyle     moderate   Examination  Body Structures and Functions, activity limitations and participation restrictions that may impact the plan of care Body Regions:   back  lower extremities    Body Systems:    gross symmetry  ROM  strength  balance  gait    Participation Restrictions:       Activity limitations:   Learning and applying knowledge  no deficits    General Tasks and Commands  no deficits    Communication  no deficits    Mobility  walking    Self care  washing oneself (bathing, drying, washing hands)  caring for body parts (brushing teeth, shaving, grooming)    Domestic Life  doing house work (cleaning house, washing dishes, laundry)    Interactions/Relationships  no deficits    Life Areas  employment    Community and Social Life  community life  recreation and leisure         high   Clinical Presentation evolving clinical presentation with changing clinical characteristics moderate   Decision Making/ Complexity Score: moderate     Goals:  Short Term Goals: 3 weeks   1) Pt will be I with established HEP - met 1/30/20  2) SLS on the right LE will be 10 seconds or longer to demonstrate improved balance - met 1/30/20  3) Pt will tolerate 20 minutes of walking without an increase in LBP - in progress     Long Term Goals: 6 weeks   1) Pt will improve left SLS to 5 seconds or greater to improve safety of ambulation on unlevel surfaces - met 1/30/20  2) Pain will be no worse then 4/10 during all ADL related activities - in progress   3) Carson will walk community distances on  level/unlevel surfaces with pain no worse then 4/10 - in progress  4) Pt will maintain L SLS for 10 seconds - in progress  5) Pt will return to exercising in a gym based setting withou 2/10 pain or less - in progress      Plan   Plan of care Certification: 5/19/2020 to 07/17/2020.    Outpatient Physical Therapy 2 times weekly for 12 visits to include the following interventions: Aquatic Therapy, Electrical Stimulation prn, Manual Therapy, Neuromuscular Re-ed, Patient Education, Therapeutic Activites and Therapeutic Exercise.     Bairon Whitten, PT

## 2020-05-26 NOTE — PROGRESS NOTES
"  Physical Therapy Aquatic Treatment Note     Name: Carson Rivers  Clinic Number: [id number]    Therapy Diagnosis:   Encounter Diagnoses   Name Primary?    Pain in both feet     Impaired functional mobility, balance, and endurance      Physician: Farrukh Cleveland MD  Visit Date: 5/27/2020  Physician Orders: Aquatic Therapy   Medical Diagnosis from Referral: Foot pain   Evaluation Date: 12/30/2019  Authorization Period Expiration: 08/12/2020  Plan of Care Expiration: 02/14/2020  Visit # / Visits authorized: 8/12     Time In:1145  Time Out:1230  Total Billable Time: 45 minutes     Precautions: Standard      Subjective     Pt reports he feels weak from being out of the pool for so long. He reports his pool at home has been to cold for him to get in. "I think I will need 6-8 pool visits before going to land-based therapy. My balance is getting better."   he was compliant with home exercise program given last session.     Pain: 0/10   Location:  B feet     Objective     Carson received aquatic exercises to develop strength, endurance, ROM, flexibility, posture and core stabilization for 45 minutes including:    Stretches 2 x 20 sec   HSS  Quad    LE exs 20x each 2#  Mini Squat with QS  Heel Raise with GS   Hip flex/ext  Hip ABD/ADD  Hip Circles CW/CCW  Lunges Side/FWD  SLS 3 x 30 sec   Step-ups - NP time constraints    Endurance 5 min  Bicycle     Home Exercises Provided and Patient Education Provided     Education provided:   - progress towards goals   - role of therapy     Written Home Exercises Provided: Patient was not issued HEP for pool.  Exercises were reviewed and Carson was able to demonstrate them prior to the end of the session.   Pt received a written copy of exercises to perform at home.     Carson demonstrated good  understanding of the education provided.     Assessment   Carson was able to perform all exercises in the pool with no exacerbations of pain. He is focused on the lack of motion in his toes and " wants to improve function. Good stability noted with minimal sway with SLS in the pool.     Carson is progressing fair  towards his goals today.   Pt prognosis is Good.     Pt's spiritual, cultural and educational needs considered and pt agreeable to plan of care and goals.    Anticipated barriers to physical therapy: none    Goals:     Short Term Goals: 3 weeks   1) Pt will be I with established HEP   2) SLS on the right LE will be 10 seconds or longer to demonstrate improved balance   3) Pt will tolerate 20 minutes of walking without an increase in LBP      Long Term Goals: 6 weeks   1) Pt will improve left SLS to 5 seconds or greater to improve safety of ambulation on unlevel surfaces  2) Pain will be no worse then 4/10 during all ADL related activities   3) Carson will walk community distances on level/unlevel surfaces with pain no worse then 4/10    Plan     Continue aquatic therapy to improve tolerance to exercise and LE strength    Bairon Whitten, PT

## 2020-05-27 ENCOUNTER — CLINICAL SUPPORT (OUTPATIENT)
Dept: REHABILITATION | Facility: HOSPITAL | Age: 50
End: 2020-05-27
Payer: MEDICARE

## 2020-05-27 DIAGNOSIS — Z74.09 IMPAIRED FUNCTIONAL MOBILITY, BALANCE, AND ENDURANCE: ICD-10-CM

## 2020-05-27 DIAGNOSIS — M79.672 PAIN IN BOTH FEET: ICD-10-CM

## 2020-05-27 DIAGNOSIS — M79.671 PAIN IN BOTH FEET: ICD-10-CM

## 2020-05-27 PROCEDURE — 97113 AQUATIC THERAPY/EXERCISES: CPT | Mod: PO

## 2020-05-29 ENCOUNTER — CLINICAL SUPPORT (OUTPATIENT)
Dept: REHABILITATION | Facility: HOSPITAL | Age: 50
End: 2020-05-29
Payer: MEDICARE

## 2020-05-29 DIAGNOSIS — M79.671 PAIN IN BOTH FEET: ICD-10-CM

## 2020-05-29 DIAGNOSIS — Z74.09 IMPAIRED FUNCTIONAL MOBILITY, BALANCE, AND ENDURANCE: ICD-10-CM

## 2020-05-29 DIAGNOSIS — M79.672 PAIN IN BOTH FEET: ICD-10-CM

## 2020-05-29 PROCEDURE — 97113 AQUATIC THERAPY/EXERCISES: CPT | Mod: PO,CQ

## 2020-05-29 NOTE — PROGRESS NOTES
"  Physical Therapy Aquatic Treatment Note     Name: Carson Rivers  Clinic Number: [id number]    Therapy Diagnosis:   Encounter Diagnoses   Name Primary?    Pain in both feet     Impaired functional mobility, balance, and endurance      Physician: Farrukh Cleveland MD  Visit Date: 5/29/2020  Physician Orders: Aquatic Therapy   Medical Diagnosis from Referral: Foot pain   Evaluation Date: 12/30/2019  Authorization Period Expiration: 08/12/2020  Plan of Care Expiration: 02/14/2020  Visit # / Visits authorized: 8/12     Time In:9:20  Time Out:10:05  Total Billable Time: 45 minutes     Precautions: Standard      Subjective     Pt reports he still feels weak from being out of the pool for so long. He reports his pool at home has warmed up and he has been able to get in. He also reports that his balance is getting better." He requests not to use wts on LE today.   he was compliant with home exercise program given last session.     Pain: 0/10   Location:  B feet     Objective     Carson received aquatic exercises to develop strength, endurance, ROM, flexibility, posture and core stabilization for 45 minutes including:    Stretches 2 x 20 sec   HSS  Quad    LE exs 20x each   Mini Squat with QS  Heel Raise with GS   Hip flex/ext  Hip ABD/ADD  Hip Circles CW/CCW  Lunges Side/FWD NP time constraints  SLS 3 x 30 sec   Step-ups -     Endurance 5 min  Bicycle     Home Exercises Provided and Patient Education Provided     Education provided:   - progress towards goals   - role of therapy     Written Home Exercises Provided: Patient was not issued HEP for pool.  Exercises were reviewed and Carson was able to demonstrate them prior to the end of the session.   Pt received a written copy of exercises to perform at home.     Carson demonstrated good  understanding of the education provided.     Assessment   Carson was able to perform all exercises in the pool with complaint of pain.He demonstrated good effort with all activities. . Good " stability noted with minimal sway with SLS in the pool.     Carson is progressing fair  towards his goals today.   Pt prognosis is Good.     Pt's spiritual, cultural and educational needs considered and pt agreeable to plan of care and goals.    Anticipated barriers to physical therapy: none    Goals:     Short Term Goals: 3 weeks   1) Pt will be I with established HEP   2) SLS on the right LE will be 10 seconds or longer to demonstrate improved balance   3) Pt will tolerate 20 minutes of walking without an increase in LBP      Long Term Goals: 6 weeks   1) Pt will improve left SLS to 5 seconds or greater to improve safety of ambulation on unlevel surfaces  2) Pain will be no worse then 4/10 during all ADL related activities   3) Carson will walk community distances on level/unlevel surfaces with pain no worse then 4/10    Plan     Continue aquatic therapy to improve tolerance to exercise and LE strength    Ruperto Eng, PTA

## 2020-06-01 ENCOUNTER — TELEPHONE (OUTPATIENT)
Dept: CARDIOLOGY | Facility: CLINIC | Age: 50
End: 2020-06-01

## 2020-06-01 ENCOUNTER — CLINICAL SUPPORT (OUTPATIENT)
Dept: REHABILITATION | Facility: HOSPITAL | Age: 50
End: 2020-06-01
Payer: MEDICARE

## 2020-06-01 DIAGNOSIS — M79.671 PAIN IN BOTH FEET: ICD-10-CM

## 2020-06-01 DIAGNOSIS — M79.672 PAIN IN BOTH FEET: ICD-10-CM

## 2020-06-01 DIAGNOSIS — Z74.09 IMPAIRED FUNCTIONAL MOBILITY, BALANCE, AND ENDURANCE: ICD-10-CM

## 2020-06-01 PROCEDURE — 97113 AQUATIC THERAPY/EXERCISES: CPT | Mod: PO,CQ

## 2020-06-01 NOTE — PROGRESS NOTES
Physical Therapy Aquatic Treatment Note     Name: Carson Rivers  Clinic Number: [id number]    Therapy Diagnosis:   Encounter Diagnoses   Name Primary?    Pain in both feet     Impaired functional mobility, balance, and endurance      Physician: Farrukh Cleveland MD  Visit Date: 6/1/2020  Physician Orders: Aquatic Therapy   Medical Diagnosis from Referral: Foot pain   Evaluation Date: 12/30/2019  Authorization Period Expiration: 08/12/2020  Plan of Care Expiration: 02/14/2020  Visit # / Visits authorized: 9/12     Time In:3:00PM  Time Out:3:45PM  Total Billable Time: 45 minutes     Precautions: Standard      Subjective     Pt reports that he is w/o complaint today. He reports no adverse effects from last tx.    he was compliant with home exercise program given last session.     Pain: 0/10   Location:  B feet     Objective     Carson received aquatic exercises to develop strength, endurance, ROM, flexibility, posture and core stabilization for 45 minutes including:    Stretches 2 x 20 sec   HSS  Quad    LE exs 20x each 2#  Mini Squat with QS  Heel Raise with GS   Hip flex/ext  Hip ABD/ADD  Hip Circles CW/CCW  Lunges Side/FWD   SLS 3 x 30 sec   Step-ups -     Endurance 5 min  Bicycle     Home Exercises Provided and Patient Education Provided     Education provided:   - progress towards goals   - role of therapy     Written Home Exercises Provided: Patient was not issued HEP for pool.  Exercises were reviewed and Carson was able to demonstrate them prior to the end of the session.   Pt received a written copy of exercises to perform at home.     Carson demonstrated good  understanding of the education provided.     Assessment   Carson was able to progress with resistance withl exercises in the pool  today without complaint of pain.He demonstrated good effort with all activities. . He continues to demonstrate good stability with minimal sway with SLS in the pool.     Carson is progressing fair  towards his goals today.   Pt  prognosis is Good.     Pt's spiritual, cultural and educational needs considered and pt agreeable to plan of care and goals.    Anticipated barriers to physical therapy: none    Goals:     Short Term Goals: 3 weeks   1) Pt will be I with established HEP   2) SLS on the right LE will be 10 seconds or longer to demonstrate improved balance   3) Pt will tolerate 20 minutes of walking without an increase in LBP      Long Term Goals: 6 weeks   1) Pt will improve left SLS to 5 seconds or greater to improve safety of ambulation on unlevel surfaces  2) Pain will be no worse then 4/10 during all ADL related activities   3) Carson will walk community distances on level/unlevel surfaces with pain no worse then 4/10    Plan     Continue aquatic therapy to improve tolerance to exercise and LE strength    Ruperto Eng, PTA

## 2020-06-03 ENCOUNTER — CLINICAL SUPPORT (OUTPATIENT)
Dept: REHABILITATION | Facility: HOSPITAL | Age: 50
End: 2020-06-03
Payer: MEDICARE

## 2020-06-03 DIAGNOSIS — M79.671 PAIN IN BOTH FEET: ICD-10-CM

## 2020-06-03 DIAGNOSIS — M54.16 LUMBAR RADICULOPATHY: ICD-10-CM

## 2020-06-03 DIAGNOSIS — M79.672 PAIN IN BOTH FEET: ICD-10-CM

## 2020-06-03 DIAGNOSIS — Z74.09 IMPAIRED FUNCTIONAL MOBILITY, BALANCE, AND ENDURANCE: ICD-10-CM

## 2020-06-03 DIAGNOSIS — R53.1 DECREASED STRENGTH: ICD-10-CM

## 2020-06-03 DIAGNOSIS — M79.18 MYOFASCIAL MUSCLE PAIN: ICD-10-CM

## 2020-06-03 PROCEDURE — 97113 AQUATIC THERAPY/EXERCISES: CPT | Mod: PO,CQ

## 2020-06-03 NOTE — PROGRESS NOTES
Physical Therapy Aquatic Treatment Note     Name: Carson Rivers  Clinic Number: [id number]    Therapy Diagnosis:   Encounter Diagnoses   Name Primary?    Pain in both feet     Impaired functional mobility, balance, and endurance     Lumbar radiculopathy     Decreased strength     Myofascial muscle pain      Physician: Farrukh Cleveland MD  Visit Date: 6/3/2020  Physician Orders: Aquatic Therapy   Medical Diagnosis from Referral: Foot pain   Evaluation Date: 12/30/2019  Authorization Period Expiration: 08/12/2020  Plan of Care Expiration: 02/14/2020  Visit # / Visits authorized: 10/12     Time In:3:50PM  Time Out:4:45PM  Total Billable Time: 55 minutes     Precautions: Standard      Subjective     Pt reports that he is w/o complaint of pn today. He reports no adverse effects from increased resistance last tx.    he was compliant with home exercise program given last session.     Pain: 0/10   Location:  B feet     Objective     Carson received aquatic exercises to develop strength, endurance, ROM, flexibility, posture and core stabilization for 45 minutes including:    Stretches 2 x 20 sec   HSS  Quad    LE exs 20x each 2.5#  Mini Squat with QS  Heel Raise with GS   Hip flex/ext  Hip ABD/ADD  Hip Circles CW/CCW  Lunges Side/FWD   SLS 3 x 30 sec   Step-ups     Endurance 5 min  Bicycle     Home Exercises Provided and Patient Education Provided     Education provided:   - progress towards goals   - role of therapy     Written Home Exercises Provided: Patient was not issued HEP for pool.  Exercises were reviewed and Carson was able to demonstrate them prior to the end of the session.   Pt received a written copy of exercises to perform at home.     Carson demonstrated good  understanding of the education provided.     Assessment   Carson was able to progress with resistance withl exercises in the pool again  today without complaint of pain.He demonstrated good effort with all activities. He continues to demonstrate good  stability with minimal sway with SLS in the pool. Carson has shown progress with mayela to progression of exs, however continues to reports bal deficits and pn with activities at home.      Carson is progressing fair  towards his goals today.   Pt prognosis is Good.     Pt's spiritual, cultural and educational needs considered and pt agreeable to plan of care and goals.    Anticipated barriers to physical therapy: none    Goals:     Short Term Goals: 3 weeks   1) Pt will be I with established HEP   2) SLS on the right LE will be 10 seconds or longer to demonstrate improved balance   3) Pt will tolerate 20 minutes of walking without an increase in LBP      Long Term Goals: 6 weeks   1) Pt will improve left SLS to 5 seconds or greater to improve safety of ambulation on unlevel surfaces  2) Pain will be no worse then 4/10 during all ADL related activities   3) Carson will walk community distances on level/unlevel surfaces with pain no worse then 4/10    Plan     Continue aquatic therapy to improve tolerance to exercise and LE strength    Ruperto Eng, PTA

## 2020-06-12 ENCOUNTER — CLINICAL SUPPORT (OUTPATIENT)
Dept: REHABILITATION | Facility: HOSPITAL | Age: 50
End: 2020-06-12
Payer: MEDICARE

## 2020-06-12 ENCOUNTER — OFFICE VISIT (OUTPATIENT)
Dept: CARDIOLOGY | Facility: CLINIC | Age: 50
End: 2020-06-12
Payer: MEDICARE

## 2020-06-12 ENCOUNTER — HOSPITAL ENCOUNTER (OUTPATIENT)
Dept: CARDIOLOGY | Facility: HOSPITAL | Age: 50
Discharge: HOME | End: 2020-06-12
Payer: MEDICARE

## 2020-06-12 ENCOUNTER — TELEPHONE (OUTPATIENT)
Dept: CARDIOLOGY | Facility: CLINIC | Age: 50
End: 2020-06-12

## 2020-06-12 VITALS
DIASTOLIC BLOOD PRESSURE: 78 MMHG | SYSTOLIC BLOOD PRESSURE: 136 MMHG | OXYGEN SATURATION: 97 % | HEIGHT: 71 IN | HEART RATE: 68 BPM | TEMPERATURE: 98 F | BODY MASS INDEX: 30.66 KG/M2 | WEIGHT: 219 LBS

## 2020-06-12 DIAGNOSIS — F17.200 SMOKER: ICD-10-CM

## 2020-06-12 DIAGNOSIS — Z74.09 IMPAIRED FUNCTIONAL MOBILITY, BALANCE, AND ENDURANCE: ICD-10-CM

## 2020-06-12 DIAGNOSIS — Z82.49 FAMILY HISTORY OF CORONARY ARTERY DISEASE: ICD-10-CM

## 2020-06-12 DIAGNOSIS — M79.671 PAIN IN BOTH FEET: ICD-10-CM

## 2020-06-12 DIAGNOSIS — I10 ESSENTIAL HYPERTENSION: ICD-10-CM

## 2020-06-12 DIAGNOSIS — M79.672 PAIN IN BOTH FEET: ICD-10-CM

## 2020-06-12 DIAGNOSIS — E78.5 DYSLIPIDEMIA: ICD-10-CM

## 2020-06-12 DIAGNOSIS — I35.8 AORTIC VALVE SCLEROSIS: Chronic | ICD-10-CM

## 2020-06-12 DIAGNOSIS — I10 ESSENTIAL HYPERTENSION: Primary | ICD-10-CM

## 2020-06-12 DIAGNOSIS — E66.09 CLASS 1 OBESITY DUE TO EXCESS CALORIES WITH SERIOUS COMORBIDITY AND BODY MASS INDEX (BMI) OF 34.0 TO 34.9 IN ADULT: ICD-10-CM

## 2020-06-12 PROCEDURE — 99999 PR PBB SHADOW E&M-EST. PATIENT-LVL III: CPT | Mod: PBBFAC,,, | Performed by: NURSE PRACTITIONER

## 2020-06-12 PROCEDURE — 99214 PR OFFICE/OUTPT VISIT, EST, LEVL IV, 30-39 MIN: ICD-10-PCS | Mod: S$GLB,,, | Performed by: NURSE PRACTITIONER

## 2020-06-12 PROCEDURE — 3008F PR BODY MASS INDEX (BMI) DOCUMENTED: ICD-10-PCS | Mod: CPTII,S$GLB,, | Performed by: NURSE PRACTITIONER

## 2020-06-12 PROCEDURE — 97110 THERAPEUTIC EXERCISES: CPT | Mod: PO,CQ

## 2020-06-12 PROCEDURE — 3075F PR MOST RECENT SYSTOLIC BLOOD PRESS GE 130-139MM HG: ICD-10-PCS | Mod: CPTII,S$GLB,, | Performed by: NURSE PRACTITIONER

## 2020-06-12 PROCEDURE — 3075F SYST BP GE 130 - 139MM HG: CPT | Mod: CPTII,S$GLB,, | Performed by: NURSE PRACTITIONER

## 2020-06-12 PROCEDURE — 99214 OFFICE O/P EST MOD 30 MIN: CPT | Mod: S$GLB,,, | Performed by: NURSE PRACTITIONER

## 2020-06-12 PROCEDURE — 99999 PR PBB SHADOW E&M-EST. PATIENT-LVL III: ICD-10-PCS | Mod: PBBFAC,,, | Performed by: NURSE PRACTITIONER

## 2020-06-12 PROCEDURE — 3078F PR MOST RECENT DIASTOLIC BLOOD PRESSURE < 80 MM HG: ICD-10-PCS | Mod: CPTII,S$GLB,, | Performed by: NURSE PRACTITIONER

## 2020-06-12 PROCEDURE — 3008F BODY MASS INDEX DOCD: CPT | Mod: CPTII,S$GLB,, | Performed by: NURSE PRACTITIONER

## 2020-06-12 PROCEDURE — 3078F DIAST BP <80 MM HG: CPT | Mod: CPTII,S$GLB,, | Performed by: NURSE PRACTITIONER

## 2020-06-12 RX ORDER — BUPRENORPHINE HYDROCHLORIDE 8 MG/1
1 TABLET SUBLINGUAL DAILY
COMMUNITY
Start: 2019-04-22

## 2020-06-12 RX ORDER — ALPRAZOLAM 2 MG/1
20 TABLET ORAL 2 TIMES DAILY
COMMUNITY
Start: 2020-06-03

## 2020-06-12 NOTE — TELEPHONE ENCOUNTER
I called Dr Huggins today and pt never had an appt. In March I had left pt a vm on his work number since after numerous times I was not able to leave a voice message on his personal numbers. Pt needed to confirm he would see Dr Huggins. Cm  Today, after visit with Shyann Alas NP. Pt agrees to see Dr Huggins and  I sent over fax with info needed to attn Colleen with Dr Huggins to call and make pt an appt. Fax to [phone]

## 2020-06-12 NOTE — PROGRESS NOTES
Physical Therapy Daily Treatment Note     Name: Carson Rivers  Clinic Number: [id number]    Therapy Diagnosis:   Encounter Diagnoses   Name Primary?    Pain in both feet     Impaired functional mobility, balance, and endurance      Physician: Farrukh Cleveland MD    Visit Date: 6/12/2020   Visit Date: 6/3/2020  Physician Orders: Aquatic Therapy   Medical Diagnosis from Referral: Foot pain   Evaluation Date: 12/30/2019  Authorization Period Expiration: 08/12/2020  Plan of Care Expiration: 02/14/2020  Visit # / Visits authorized: 11/12     Time In:10:50AM  Time Out:11:20AM  Total Billable Time: 30 minutes     Precautions: Standard      Subjective     Pt reports: w/o complaint upon arrival, Reports that he had a procedure on R elbow and can not get in the water for 2 weeks. He began to c/p R elbow pn during tx and D/C'd tx.  He was not compliant with home exercise program.  Response to previous treatment: no adverse effects  Functional change: no consistent change     Pain: 0/10  Location:  feet      Objective     Carson received therapeutic exercises to develop strength, endurance, ROM and flexibility for 30 minutes including:  Recumbent stat bike x 10 m in for endurance, flexibilty  GSS x 2 min incline  HSS 3 x 20 sec at stairs    LE exs 20x each   Mini Squat  Hip flex/ext  Hip ABD/ADD      Home Exercises Provided and Patient Education Provided     Education provided:   - effects of therapy    Written Home Exercises Provided: Patient instructed to cont prior HEP.  Exercises were reviewed and Carson was able to demonstrate them prior to the end of the session.  Carson demonstrated good  understanding of the education provided.     See EMR under Patient Instructions for exercises provided prior visit.    Assessment     Carson was w/o complaint of foot or back pn with land based tx today, however did experience increasing R elbow due to recent procedure.He was unable to continue with tx session.   Carson is progressing  well towards his goals.   Pt prognosis is Fair.     Pt will continue to benefit from skilled outpatient physical therapy to address the deficits listed in the problem list box on initial evaluation, provide pt/family education and to maximize pt's level of independence in the home and community environment.     Pt's spiritual, cultural and educational needs considered and pt agreeable to plan of care and goals.    Anticipated barriers to physical therapy: Chronic nature of condition    Goals:   Short Term Goals: 3 weeks   1) Pt will be I with established HEP   2) SLS on the right LE will be 10 seconds or longer to demonstrate improved balance   3) Pt will tolerate 20 minutes of walking without an increase in LBP      Long Term Goals: 6 weeks   1) Pt will improve left SLS to 5 seconds or greater to improve safety of ambulation on unlevel surfaces  2) Pain will be no worse then 4/10 during all ADL related activities   3) Carson will walk community distances on level/unlevel surfaces with pain no worse then 4/10      Plan     Continue therapy to improve tolerance to exercise and LE strength       Ruperto Eng, PTA

## 2020-06-12 NOTE — PROGRESS NOTES
Subjective:    Patient ID:  Carson Rivers is a 50 y.o. male who presents for evaluation of No chief complaint on file.      HPI: Mr. Carson Rivers presents to the clinic for follow up HTN and hyperlipidemia. He stated that he is doing ok; he just had surgery on right elbow and is doing physical therapy. He denied any chest pain or SOB. He denied PRYOR with walking. He would like assistance with finding a vascular medicine physician to see him for venous insufficiency evaluation.    CRF- smoking, family hx of CAD,     Medications: he is not missing any doses. He is taking labetalol, amlodipine, and aldactone.  Sodium: he does add salt to foods,  he is not reading labels for sodium content.   Dietary Fats: fried fish and shrimp; prefers starchy vegetables  Dietary Sugars: Dr. Pepper throughout the day-as much as 2 liters/day.  Exercise: he is limited by arthritis pain. He is doing physical therapy twice a week. He is walking.  Tobacco: currently smoking 15 cigarettes/day; he stated that is doing tobacco cessation program at [place] [facility]. He is not wearing NRT patch; no longer taking chantix.  Alcohol: no alcohol use     Weight: 99.3 kg (219 lb) he states that his daily weights has been stable Body mass index is 30.54 kg/m².  Wt Readings from Last 3 Encounters:   06/12/20 99.3 kg (219 lb)   06/26/19 110.1 kg (242 lb 9.9 oz)   04/26/19 112.1 kg (247 lb 2.2 oz)     BP log: none; stated that it has been 120-130/68 at home.    Review of Systems   Constitution: Negative for chills, decreased appetite, fever, night sweats, weight gain and weight loss.   HENT: Negative for congestion.    Cardiovascular: Negative for chest pain, claudication, cyanosis, dyspnea on exertion, irregular heartbeat, leg swelling, near-syncope, orthopnea, palpitations, paroxysmal nocturnal dyspnea and syncope.   Respiratory: Negative for cough, hemoptysis, shortness of breath, sputum production and wheezing.    Hematologic/Lymphatic: Negative for  adenopathy and bleeding problem. Does not bruise/bleed easily.   Skin: Negative for color change and nail changes.   Musculoskeletal: Positive for arthritis (multiple joints and his back.).   Gastrointestinal: Negative for bloating, abdominal pain, change in bowel habit, heartburn, hematochezia, melena, nausea and vomiting.   Genitourinary: Negative for hematuria.   Neurological: Negative for dizziness and light-headedness.   Psychiatric/Behavioral: Negative for altered mental status.       Objective:   Physical Exam   Constitutional: He is oriented to person, place, and time. He appears well-developed and well-nourished. No distress.   HENT:   Head: Normocephalic and atraumatic.   Eyes: Conjunctivae are normal. No scleral icterus.   Neck: Neck supple. No JVD present. No tracheal deviation present. No thyromegaly present.   Cardiovascular: Normal rate, regular rhythm and intact distal pulses. Exam reveals no gallop and no friction rub.   Murmur heard.   Harsh midsystolic murmur is present with a grade of 2/6 at the upper right sternal border radiating to the neck.  Pulmonary/Chest: Effort normal and breath sounds normal. No respiratory distress. He has no wheezes. He has no rales. He exhibits no tenderness.   Abdominal: Soft. Bowel sounds are normal. He exhibits no distension and no mass. There is no tenderness. There is no rebound and no guarding.   Musculoskeletal: Normal range of motion. He exhibits no edema.   Lymphadenopathy:     He has no cervical adenopathy.   Neurological: He is alert and oriented to person, place, and time.   Skin: Skin is warm and dry. No rash noted. He is not diaphoretic. No erythema. No pallor.   Pink   Psychiatric: He has a normal mood and affect.      Results for JODY PATEL (MRN [id number]) as of 4/26/2019 08:20   Ref. Range 4/2/2019 08:40   Alkaline Phosphatase Latest Ref Range: 55 - 135 U/L 99   PROTEIN TOTAL Latest Ref Range: 6.0 - 8.4 g/dL 7.5   Albumin Latest Ref Range: 3.5 - 5.2  g/dL 4.1   BILIRUBIN TOTAL Latest Ref Range: 0.1 - 1.0 mg/dL 0.4   Bilirubin, Direct Latest Ref Range: 0.1 - 0.3 mg/dL 0.2   AST Latest Ref Range: 10 - 40 U/L 33   ALT Latest Ref Range: 10 - 44 U/L 40   Triglycerides Latest Ref Range: 30 - 150 mg/dL 206 (H)   Cholesterol Latest Ref Range: 120 - 199 mg/dL 167   HDL Latest Ref Range: 40 - 75 mg/dL 25 (L)   HDL/Chol Ratio Latest Ref Range: 20.0 - 50.0 % 15.0 (L)   LDL Cholesterol Latest Ref Range: 63.0 - 159.0 mg/dL 100.8   Non-HDL Cholesterol Latest Units: mg/dL 142   Total Cholesterol/HDL Ratio Latest Ref Range: 2.0 - 5.0  6.7 (H)     Pharm NM Stress test 4/17/19: Impression: NORMAL MYOCARDIAL PERFUSION  1. The perfusion scan is free of evidence for myocardial ischemia or injury.   2. Resting wall motion is physiologic.   3. Resting LV function is normal.   4. The ventricular volumes are normal at rest and stress.   5. The extracardiac distribution of radioactivity is normal.   6. There was no previous study available to compare.    Echo 4/2/19: CONCLUSIONS     1 - Normal left ventricular systolic function (EF 60-65%).     2 - Normal left ventricular diastolic function.     3 - Normal right ventricular systolic function .     4 - Concentric hypertrophy.     Assessment:      1. Essential hypertension    2. Dyslipidemia    3. Aortic valve sclerosis    4. Class 1 obesity due to excess calories with serious comorbidity and body mass index (BMI) of 34.0 to 34.9 in adult    5. Smoker    6. Family history of coronary artery disease        Plan:     Essential hypertension  -     Basic metabolic panel; Future; Expected date: 06/12/2020    Dyslipidemia  -     ALT (SGPT); Future; Expected date: 06/12/2020  -     Basic metabolic panel; Future; Expected date: 06/12/2020  -     Lipid Panel; Future; Expected date: 06/12/2020  -     Insulin, random; Future; Expected date: 06/12/2020    Aortic valve sclerosis    Class 1 obesity due to excess calories with serious comorbidity and body  mass index (BMI) of 34.0 to 34.9 in adult  -     ALT (SGPT); Future; Expected date: 06/12/2020  -     Basic metabolic panel; Future; Expected date: 06/12/2020  -     Lipid Panel; Future; Expected date: 06/12/2020  -     Insulin, random; Future; Expected date: 06/12/2020    Smoker  -     ALT (SGPT); Future; Expected date: 06/12/2020  -     Basic metabolic panel; Future; Expected date: 06/12/2020  -     Lipid Panel; Future; Expected date: 06/12/2020  -     Insulin, random; Future; Expected date: 06/12/2020    Family history of coronary artery disease  -     ALT (SGPT); Future; Expected date: 06/12/2020  -     Basic metabolic panel; Future; Expected date: 06/12/2020  -     Lipid Panel; Future; Expected date: 06/12/2020  -     Insulin, random; Future; Expected date: 06/12/2020       Continue labetalol, spironolactone, and amlodipine as directed- HTN  Labs: BMP, FLP, ALT fasting insulin with phone review. He will get these done at [facility].  Monitor BP at home.  Recommended reducing/eliminating sodas due to sugar content.  Encouraged reduction in processed foods, white starches, and increase non-starchy vegetables. Discussed making small, progressive changes in lifestyle.  Sodium restriction encouraged.  Tobacco cessation counseling. He and is wife are working on it at home. He verbalized his understanding of CV consequences of tobacco use.  Encouraged exercise-even chair exercises and walking as much as possible.  Encouraged continued weight loss.  Referred Mr. Rivers to Dr. Huggins in [place] in March 2020. No appointment was made. Will try again.  Follow up with Dr. Antunez in 3 months or call sooner for any problems.    Addendum October 9, 2020: lab report received from [facility] collected on September 1, 2020: cholesterol 167, HDL 29, , triglycerides 146, chol/HDL 5.8, Non . Glucose 123, BUN 11, creatinine 0.86, eGFR 100, sodium 136, K 4.3 ALT 27, insulin 34.3. elevated triglyceride/HDL and  fasting insulin and glucose. Will discuss diet and lifestyle changes to reduce CVD risk at next appointment with me.  Shyann Alas NP  [facility]

## 2020-06-18 ENCOUNTER — CLINICAL SUPPORT (OUTPATIENT)
Dept: REHABILITATION | Facility: HOSPITAL | Age: 50
End: 2020-06-18
Payer: MEDICARE

## 2020-06-18 DIAGNOSIS — Z74.09 IMPAIRED FUNCTIONAL MOBILITY, BALANCE, AND ENDURANCE: ICD-10-CM

## 2020-06-18 DIAGNOSIS — M79.671 PAIN IN BOTH FEET: ICD-10-CM

## 2020-06-18 DIAGNOSIS — M79.672 PAIN IN BOTH FEET: ICD-10-CM

## 2020-06-18 PROCEDURE — 97110 THERAPEUTIC EXERCISES: CPT | Mod: PO | Performed by: PHYSICAL THERAPIST

## 2020-06-18 NOTE — PROGRESS NOTES
Physical Therapy Daily Treatment Note     Name: Carson Rivers  Clinic Number: [id number]    Therapy Diagnosis:   Encounter Diagnoses   Name Primary?    Pain in both feet     Impaired functional mobility, balance, and endurance      Physician: Farrukh Cleveland MD    Visit Date: 6/18/2020   Visit Date: 6/3/2020  Physician Orders: Aquatic Therapy   Medical Diagnosis from Referral: Foot pain   Evaluation Date: 12/30/2019  Authorization Period Expiration: 08/12/2020  Plan of Care Expiration: 07/17/2020  Visit # / Visits authorized: 12/12     Time In: 3:00 PM  Time Out:3:32 PM  Total Billable Time: 32 minutes     Precautions: Standard      Subjective     Pt reports: That he feels like he has regressed some since not being able to attend consistently during the pandemic.   He was not compliant with home exercise program.  Response to previous treatment: no adverse effects  Functional change: Improved walking endurance      Pain: 0/10  Location:  feet      Objective     Carson received therapeutic exercises to develop strength, endurance, ROM and flexibility for 30 minutes including:  Recumbent stat bike x 10 m in for endurance, flexibilty  GSS x 2 min incline  HSS 3 x 20 sec at stairs    LE exs 20x each   Mini Squat  Matrix Hip flex/ext 25#   Matrix Hip ABD 25#      Home Exercises Provided and Patient Education Provided     Education provided:   - effects of therapy    Written Home Exercises Provided: Patient instructed to cont prior HEP.  Exercises were reviewed and Carson was able to demonstrate them prior to the end of the session.  Carson demonstrated good  understanding of the education provided.     See EMR under Patient Instructions for exercises provided prior visit.    Assessment     Carson fatigued quickly with land based exercises. However, this is what he needs to be progressed to to maximize function.   Carson is progressing well towards his goals.   Pt prognosis is Fair.     Pt will continue to benefit from  skilled outpatient physical therapy to address the deficits listed in the problem list box on initial evaluation, provide pt/family education and to maximize pt's level of independence in the home and community environment.     Pt's spiritual, cultural and educational needs considered and pt agreeable to plan of care and goals.    Anticipated barriers to physical therapy: Chronic nature of condition    Goals:   Short Term Goals: 3 weeks   1) Pt will be I with established HEP   2) SLS on the right LE will be 10 seconds or longer to demonstrate improved balance   3) Pt will tolerate 20 minutes of walking without an increase in LBP      Long Term Goals: 6 weeks   1) Pt will improve left SLS to 5 seconds or greater to improve safety of ambulation on unlevel surfaces  2) Pain will be no worse then 4/10 during all ADL related activities   3) Carson will walk community distances on level/unlevel surfaces with pain no worse then 4/10      Plan     Reassess next visit       Jose Clark, PT

## 2020-06-19 ENCOUNTER — CLINICAL SUPPORT (OUTPATIENT)
Dept: REHABILITATION | Facility: HOSPITAL | Age: 50
End: 2020-06-19
Payer: MEDICARE

## 2020-06-19 DIAGNOSIS — M76.821 POSTERIOR TIBIAL TENDINITIS OF RIGHT LEG: Primary | ICD-10-CM

## 2020-06-19 DIAGNOSIS — Z74.09 IMPAIRED FUNCTIONAL MOBILITY, BALANCE, AND ENDURANCE: ICD-10-CM

## 2020-06-19 DIAGNOSIS — M79.671 PAIN IN BOTH FEET: ICD-10-CM

## 2020-06-19 DIAGNOSIS — M79.672 PAIN IN BOTH FEET: ICD-10-CM

## 2020-06-19 PROCEDURE — 97110 THERAPEUTIC EXERCISES: CPT | Mod: PO | Performed by: PHYSICAL THERAPIST

## 2020-06-19 NOTE — PROGRESS NOTES
Physical Therapy Daily Treatment Note     Name: Carson Rivers  Clinic Number: [id number]    Therapy Diagnosis:   Encounter Diagnoses   Name Primary?    Pain in both feet     Impaired functional mobility, balance, and endurance      Physician: Farrukh Cleveland MD    Visit Date: 6/19/2020   Visit Date: 6/3/2020  Physician Orders: Aquatic Therapy   Medical Diagnosis from Referral: Foot pain   Evaluation Date: 12/30/2019  Authorization Period Expiration: 08/12/2020  Plan of Care Expiration: 07/17/2020  Visit # / Visits authorized: 14/20     Time In: 10:11  AM  Time Out: 10:35 AM  Total Billable Time: 24 minutes     Precautions: Standard      Subjective     Pt reports: That he is really sore after yesterday's session, but realizes he needs to transition to a land based program.   He was not compliant with home exercise program.  Response to previous treatment: no adverse effects  Functional change: Improved walking endurance      Pain: 0/10  Location:  feet      Objective     Carson received therapeutic exercises to develop strength, endurance, ROM and flexibility for 24 minutes including:  Recumbent stat bike x 10 m in for endurance, flexibilty  Reassessment performed 14 minutes     Below not performed.  GSS x 2 min incline  HSS 3 x 20 sec at stairs    LE exs 20x each   Mini Squat  Matrix Hip flex/ext 25#   Matrix Hip ABD 25#      Home Exercises Provided and Patient Education Provided     Education provided:   - effects of therapy    Written Home Exercises Provided: Patient instructed to cont prior HEP.  Exercises were reviewed and Carson was able to demonstrate them prior to the end of the session.  Carson demonstrated good  understanding of the education provided.     See EMR under Patient Instructions for exercises provided prior visit.    Assessment     Carson was only able to perform bike activities due to LE soreness. He will benefit from continued physical therapy with a transition to full land based treatment to  maximize function..   Carson is progressing well towards his goals.   Pt prognosis is Fair.     Pt will continue to benefit from skilled outpatient physical therapy to address the deficits listed in the problem list box on initial evaluation, provide pt/family education and to maximize pt's level of independence in the home and community environment.     Pt's spiritual, cultural and educational needs considered and pt agreeable to plan of care and goals.    Anticipated barriers to physical therapy: Chronic nature of condition    Goals:   Short Term Goals: 3 weeks   1) Pt will be I with established HEP   2) SLS on the right LE will be 10 seconds or longer to demonstrate improved balance   3) Pt will tolerate 20 minutes of walking without an increase in LBP      Long Term Goals: 6 weeks   1) Pt will improve left SLS to 5 seconds or greater to improve safety of ambulation on unlevel surfaces  2) Pain will be no worse then 4/10 during all ADL related activities   3) Carson will walk community distances on level/unlevel surfaces with pain no worse then 4/10      Plan     Reassess next visit       Jose Clark, PT

## 2020-06-19 NOTE — PLAN OF CARE
[facility]  Physical Therapy Reassessment    Name: Carson Rivers  Clinic Number: [id number]    Therapy Diagnosis:   Encounter Diagnoses   Name Primary?    Pain in both feet     Impaired functional mobility, balance, and endurance      Physician: Farrukh Cleveland MD    Visit Date: 3/18/2020  Physician Orders: Aquatic Therapy   Medical Diagnosis from Referral: Foot pain   Evaluation Date: 12/30/2019  Authorization Period Expiration: 12/31/2019  Plan of Care Expiration: 07/17/2020  Visit # / Visits authorized: 15/ 20    Time In: 10:11 AM    Time Out: 10:35 AM  Total Billable Time: 24 minutes    Precautions: Standard    Subjective   Date of onset: March 2016; has worsened over last several months  History of current condition - Carson reports: A recent bout of physical therapy that was greatly beneficial. However, he continues to have some weakness in the hips and lower extremities and difficulty walking for long distances. His most recent episode of therapy was land based. He is most worried about his leg strength and being able to walk for a longer period of time.     12/30/20: Carson reports that overall, he feels much better. It feels like my balance is improving and I am getting stronger in my hips. I still have some pain, but it isn't as bad as it has been. I am still concerned about my left leg getting smaller.     3/2/20: Carson reports that he has been able to tell a significant difference since starting aquatic therapy. I am able to regain my balance when I become off balance. I am also walking better on unlevel surfaces. I want to continue therapy and transition to a land based program to help me move towards a gym based setting.     05/19/20: Carson reports he feels like he has regressed since his last PT treatment. He reports he feels like his hips are weaker. He states his foot pain 6/10 is about a 6/10 He has not been able to get into the pool because the weather has been to  cold. He got new shoes, which has helped with his pain.     06/19/20: Carson has had 6 visits since his last reassessment; 4 aquatic and 2 land based visits. He feels like he has regressed since being out of therapy due to the Covid pandemic. He does feel as if he is starting to make some gains towards his pre-covid status. He ordered a bike and is awaiting delivery. He does feel stronger overall and his pain levels remain variable. He is adamant that an additional few sessions of aquatic therapy will be beneficial in regaining pre-covid status. He did report significant soreness following back to back land based sessions.       Past Medical History:   Diagnosis Date    Hyperlipidemia     Hypertension      Carson Rivers  has no past surgical history on file.    Carson has a current medication list which includes the following prescription(s): alprazolam, amlodipine, buprenorphine hcl, famotidine, gabapentin, labetalol, meloxicam, and spironolactone.    Review of patient's allergies indicates:  No Known Allergies     Imaging: No recent imaging     Prior Therapy: Multiple episodes of land based physical therapy for back and shoulder   Social History:  lives with their family  Occupation: Currently on disability   Prior Level of Function: Significant limitations with walking for long distances   Current Level of Function: Some improvement in walking endurance, but still limitations. He has more difficulty walking on uneven terrain     Pain:  Current 0/10, worst 5/10, best 0/10   Location: right foot  Description: Aching  Aggravating Factors: Standing and Walking  Easing Factors: rest and elevation    Pts goals: To improve lower extremity strength and to be able to walk for longer distances     Objective   Mental status: alert, oriented x3  Posture/ Alignment: Fair, Maintains flexed posture with an increased thoracic kyphosis    GAIT DEVIATIONS: Carson amb with decreased daquan, decreased step length and decreased  gait speed .    ROM:   AROM % Limiation Comment   Flexion: 10%     Extension: 50%     Lat Flex R: Joint line      Lat Flex L: Joint line      Rotation R: 25%     Rotation L: 25%     *pain    Strength: Dermatomes:   Right Left Comment   L2 intact intact    L3 intact intact    L4 intact intact    L5 intact intact    S1 intact impaired - minimum    S2 intact intact    Saddle intact intact      Myotomes:   Right Left Comment   Hip flexion (L2-3): 5/5 5/5    Knee extension (L3-4): 5/5 5/5    DF (L4-5): 5/5 4-/5    Great Toe Ext (L5-S1): 5/5 trace    Great Toe Flex (S1-S2): 5/5 trace      Hip Abductors: 5/5 bilaterally   Hip extensors: 5/5 bilaterally     DTR:   Right Left Comment   Patellar (L3-4) 0 0    Achilles (S1) 1+ 1+        Functional Tests (* indicates w/ pain)   SLS: R: 12 seconds fair balance strategies  L: 6 seconds stepping strategy to regain balance   SLR: (-) for pain   Beto test: (+) bilaterally     Palpation:  No TTP elicited     Pt/family was provided educational information, including: role of PT, goals for PT, scheduling - pt verbalized understanding. Discussed insurance plan with pt.     CMS Impairment/Limitation/Restriction for FOTO Foot Survey    Therapist reviewed FOTO scores for Carson Rivers on 6/19/2020.   FOTO documents entered into [employer] - see Media section.    Limitation Score: 42%  Category: Mobility    Current : CK = at least 40% but < 60% impaired, limited or restricted         Home Exercises and Patient Education Provided    Education provided:   - POC  - Benefits of aquatic therapy     Written Home Exercises Provided: No HEP issued .  Exercises were reviewed and Carson was able to demonstrate them prior to the end of the session.  Carson demonstrated good  understanding of the education provided.       Assessment   Pt presents with a medical diagnosis of foot pain. Since beginning PT, Carson has been seen 21 times since initial evaluation on 12/30/2019. Carson was progressing well with  therapy and about to transition to a land based program prior to COVID-19 limiting his ability to participate with PT. He has returned since Covid 19 for  visits, with 4 of them being aquatic and 3 land based. He had signficant soreness following the land based visits. However, we need to transition him towards land based. He continues to have decreased lumbar ROM. His SL balance has improved and pain has decreased since returning to physical therapy. We will plan to continue PT with 2 visits in the pool initially and progress to land based therapy as appropriate.     Pt prognosis is Good.   Pt will benefit from skilled outpatient Physical Therapy to address the deficits stated above and in the chart below, provide pt/family education, and to maximize pt's level of independence.     Plan of care discussed with patient: Yes  Pt's spiritual, cultural and educational needs considered and patient is agreeable to the plan of care and goals as stated below:     Anticipated Barriers for therapy: None at this time     Medical Necessity is demonstrated by the following  History  Co-morbidities and personal factors that may impact the plan of care Co-morbidities:   high BMI and prior lumbar surgery    Personal Factors:   lifestyle     moderate   Examination  Body Structures and Functions, activity limitations and participation restrictions that may impact the plan of care Body Regions:   back  lower extremities    Body Systems:    gross symmetry  ROM  strength  balance  gait    Participation Restrictions:       Activity limitations:   Learning and applying knowledge  no deficits    General Tasks and Commands  no deficits    Communication  no deficits    Mobility  walking    Self care  washing oneself (bathing, drying, washing hands)  caring for body parts (brushing teeth, shaving, grooming)    Domestic Life  doing house work (cleaning house, washing dishes, laundry)    Interactions/Relationships  no deficits    Life  Areas  employment    Community and Social Life  community life  recreation and leisure         high   Clinical Presentation evolving clinical presentation with changing clinical characteristics moderate   Decision Making/ Complexity Score: moderate     Goals:  Short Term Goals: 3 weeks   1) Pt will be I with established HEP - met 1/30/20  2) SLS on the right LE will be 10 seconds or longer to demonstrate improved balance - met 1/30/20  3) Pt will tolerate 20 minutes of walking without an increase in LBP - in progress     Long Term Goals: 6 weeks   1) Pt will improve left SLS to 5 seconds or greater to improve safety of ambulation on unlevel surfaces - met 1/30/20  2) Pain will be no worse then 4/10 during all ADL related activities - in progress   3) Carson will walk community distances on level/unlevel surfaces with pain no worse then 4/10 - in progress  4) Pt will maintain L SLS for 10 seconds - progressing, not met   5) Pt will return to exercising in a gym based setting withou 2/10 pain or less - progressing, not met       Plan   Plan of care Certification: 6/19/2020 to 07/17/2020.    Outpatient Physical Therapy 2 times weekly for 12 visits to include the following interventions: Aquatic Therapy, Electrical Stimulation prn, Manual Therapy, Neuromuscular Re-ed, Patient Education, Therapeutic Activites and Therapeutic Exercise.     Jose Clark, PT

## 2020-06-24 ENCOUNTER — CLINICAL SUPPORT (OUTPATIENT)
Dept: REHABILITATION | Facility: HOSPITAL | Age: 50
End: 2020-06-24
Payer: MEDICARE

## 2020-06-24 DIAGNOSIS — Z74.09 IMPAIRED FUNCTIONAL MOBILITY, BALANCE, AND ENDURANCE: ICD-10-CM

## 2020-06-24 DIAGNOSIS — M79.671 PAIN IN BOTH FEET: ICD-10-CM

## 2020-06-24 DIAGNOSIS — M79.672 PAIN IN BOTH FEET: ICD-10-CM

## 2020-06-24 PROCEDURE — 97110 THERAPEUTIC EXERCISES: CPT | Mod: PO | Performed by: PHYSICAL THERAPIST

## 2020-06-24 NOTE — PROGRESS NOTES
Physical Therapy Daily Treatment Note     Name: Carson Rivers  Clinic Number: [id number]    Therapy Diagnosis:   Encounter Diagnoses   Name Primary?    Pain in both feet     Impaired functional mobility, balance, and endurance      Physician: Farrukh Cleveland MD    Visit Date: 6/24/2020   Visit Date: 6/3/2020  Physician Orders: Aquatic Therapy   Medical Diagnosis from Referral: Foot pain   Evaluation Date: 12/30/2019  Authorization Period Expiration: 08/12/2020  Plan of Care Expiration: 07/17/2020  Visit # / Visits authorized: 14/20     Time In: 11:37  AM  Time Out: 12:00 PM  Total Billable Time: 23 minutes     Precautions: Standard      Subjective     Pt reports: That he is hurting today, and feels like the weather has something to do with it. He doesn't feel like he will be able to do much today.   He was not compliant with home exercise program.  Response to previous treatment: no adverse effects  Functional change: Improved walking endurance      Pain: 7/10  Location:  feet      Objective     Carson received therapeutic exercises to develop strength, endurance, ROM and flexibility for 23 minutes including:  Recumbent stat bike x 5 m in for endurance, flexibilty    GSS x 2 min incline  HSS 3 x 20 sec at stairs    LE exs 20x each   Mini Squat  Step ups   Standing hip abduction   Standing hip extension         Home Exercises Provided and Patient Education Provided     Education provided:   - effects of therapy    Written Home Exercises Provided: Patient instructed to cont prior HEP.  Exercises were reviewed and Carson was able to demonstrate them prior to the end of the session.  Carson demonstrated good  understanding of the education provided.     See EMR under Patient Instructions for exercises provided prior visit.    Assessment     Carson continues to have poor tolerance to exercise activities secondary to pain.   Carson is progressing well towards his goals.   Pt prognosis is Fair.     Pt will continue to  benefit from skilled outpatient physical therapy to address the deficits listed in the problem list box on initial evaluation, provide pt/family education and to maximize pt's level of independence in the home and community environment.     Pt's spiritual, cultural and educational needs considered and pt agreeable to plan of care and goals.    Anticipated barriers to physical therapy: Chronic nature of condition    Goals:   Short Term Goals: 3 weeks   1) Pt will be I with established HEP   2) SLS on the right LE will be 10 seconds or longer to demonstrate improved balance   3) Pt will tolerate 20 minutes of walking without an increase in LBP      Long Term Goals: 6 weeks   1) Pt will improve left SLS to 5 seconds or greater to improve safety of ambulation on unlevel surfaces  2) Pain will be no worse then 4/10 during all ADL related activities   3) Carson will walk community distances on level/unlevel surfaces with pain no worse then 4/10      Plan     Return to pool for 2 visits and then progression towards land based activities       Jose Clark, PT

## 2020-06-29 ENCOUNTER — CLINICAL SUPPORT (OUTPATIENT)
Dept: REHABILITATION | Facility: HOSPITAL | Age: 50
End: 2020-06-29
Payer: MEDICARE

## 2020-06-29 ENCOUNTER — TELEPHONE (OUTPATIENT)
Dept: CARDIOLOGY | Facility: CLINIC | Age: 50
End: 2020-06-29

## 2020-06-29 DIAGNOSIS — M79.671 PAIN IN BOTH FEET: ICD-10-CM

## 2020-06-29 DIAGNOSIS — Z74.09 IMPAIRED FUNCTIONAL MOBILITY, BALANCE, AND ENDURANCE: ICD-10-CM

## 2020-06-29 DIAGNOSIS — M79.672 PAIN IN BOTH FEET: ICD-10-CM

## 2020-06-29 PROCEDURE — 97113 AQUATIC THERAPY/EXERCISES: CPT | Mod: PO,CQ

## 2020-06-29 NOTE — PROGRESS NOTES
Physical Therapy Aquatic Treatment Note     Name: Carson Rivers  Clinic Number: [id number]    Therapy Diagnosis:   Encounter Diagnoses   Name Primary?    Pain in both feet     Impaired functional mobility, balance, and endurance      Physician: Farrukh Cleveland MD  Visit Date: 6/29/2020  Physician Orders: Aquatic Therapy   Medical Diagnosis from Referral: Foot pain   Evaluation Date: 12/30/2019  Authorization Period Expiration: 08/12/2020  Plan of Care Expiration: 02/14/2020  Visit # / Visits authorized: 15/12     Time In11:15AM  Time Out:12:00PM  Total Billable Time: 45 minutes     Precautions: Standard      Subjective     Pt reports that his back pn is 5/10 today. He reports that he was hurting post last 2 land based sessions. He states that he does not feel that he is ready for land based tx yet.    he was compliant with home exercise program given last session.     Pain: 5/10   Location:  B feet     Objective     Carson received aquatic exercises to develop strength, endurance, ROM, flexibility, posture and core stabilization for 45 minutes including:    Stretches 2 x 20 sec   HSS  Quad    LE exs 20x each 2#  Mini Squat with QS  Heel Raise with GS   Hip flex/ext  Hip ABD/ADD  Hip Circles CW/CCW  Lunges Side/FWD   SLS 3 x 30 sec   Step-ups     Endurance 5 min  Bicycle     Home Exercises Provided and Patient Education Provided     Education provided:   - progress towards goals   - role of therapy     Written Home Exercises Provided: Patient was not issued HEP for pool.  Exercises were reviewed and Carson was able to demonstrate them prior to the end of the session.   Pt received a written copy of exercises to perform at home.     Carson demonstrated good  understanding of the education provided.     Assessment   Carson was able to return to  resistance with exercises in the pool today without complaint of pain.He demonstrated good effort with all activities. He continues to demonstrate good stability and bal with  SLS in the pool. Carson has shown progress with mayela to progression of exs, however continues to reports bal deficits and pn with activities at home.      Carson is progressing fair  towards his goals today.   Pt prognosis is Good.     Pt's spiritual, cultural and educational needs considered and pt agreeable to plan of care and goals.    Anticipated barriers to physical therapy: none    Goals:     Short Term Goals: 3 weeks   1) Pt will be I with established HEP   2) SLS on the right LE will be 10 seconds or longer to demonstrate improved balance   3) Pt will tolerate 20 minutes of walking without an increase in LBP      Long Term Goals: 6 weeks   1) Pt will improve left SLS to 5 seconds or greater to improve safety of ambulation on unlevel surfaces  2) Pain will be no worse then 4/10 during all ADL related activities   3) Carson will walk community distances on level/unlevel surfaces with pain no worse then 4/10    Plan     Continue aquatic therapy to improve tolerance to exercise and LE strength    Ruperto Eng, PTA

## 2020-06-29 NOTE — TELEPHONE ENCOUNTER
Danielle called, need pt's clinical information. Never rec'd. Told her I sent but it maybe because the time between I sent it and appt made. She gave me fax number of [phone]. Clinical notes faxed. cm

## 2020-07-06 ENCOUNTER — CLINICAL SUPPORT (OUTPATIENT)
Dept: REHABILITATION | Facility: HOSPITAL | Age: 50
End: 2020-07-06
Payer: MEDICARE

## 2020-07-06 DIAGNOSIS — M79.671 PAIN IN BOTH FEET: ICD-10-CM

## 2020-07-06 DIAGNOSIS — M79.672 PAIN IN BOTH FEET: ICD-10-CM

## 2020-07-06 DIAGNOSIS — Z74.09 IMPAIRED FUNCTIONAL MOBILITY, BALANCE, AND ENDURANCE: ICD-10-CM

## 2020-07-06 PROCEDURE — 97113 AQUATIC THERAPY/EXERCISES: CPT | Mod: PO | Performed by: PHYSICAL THERAPIST

## 2020-07-06 NOTE — PROGRESS NOTES
Physical Therapy Aquatic Treatment Note     Name: Carson Rivers  Clinic Number: [id number]    Therapy Diagnosis:   Encounter Diagnoses   Name Primary?    Pain in both feet     Impaired functional mobility, balance, and endurance      Physician: Farrukh Cleveland MD  Visit Date: 7/6/2020  Physician Orders: Aquatic Therapy   Medical Diagnosis from Referral: Foot pain   Evaluation Date: 12/30/2019  Authorization Period Expiration: 08/12/2020  Plan of Care Expiration: 02/14/2020  Visit # / Visits authorized: 17/20     Time In11:35AM  Time Out:12:15 PM  Total Billable Time: 40 minutes     Precautions: Standard      Subjective     Pt reports that his knee is bothering him today, but he isnt sure what he did to it    he was compliant with home exercise program given last session.     Pain: 5/10   Location:  B feet     Objective     Carson received aquatic exercises to develop strength, endurance, ROM, flexibility, posture and core stabilization for 40 minutes including:    Stretches 2 x 20 sec   HSS  Quad    LE exs 20x each 2#  Mini Squat with QS  Heel Raise with GS   Hip flex/ext  Hip ABD/ADD  Hip Circles CW/CCW  Lunges Side/FWD   SLS 3 x 30 sec   Step-ups - NP     Endurance 5 min  Bicycle     Home Exercises Provided and Patient Education Provided     Education provided:   - progress towards goals   - role of therapy     Written Home Exercises Provided: Patient was not issued HEP for pool.  Exercises were reviewed and Carson was able to demonstrate them prior to the end of the session.   Pt received a written copy of exercises to perform at home.     Carson demonstrated good  understanding of the education provided.     Assessment   Carson was able to perform all LE exercises with resistance and without an exacerbation of symptoms. He continues to be apprehensive about returning to a land based program.      Carson is progressing fair  towards his goals today.   Pt prognosis is Good.     Pt's spiritual, cultural and  educational needs considered and pt agreeable to plan of care and goals.    Anticipated barriers to physical therapy: none    Goals:     Short Term Goals: 3 weeks   1) Pt will be I with established HEP   2) SLS on the right LE will be 10 seconds or longer to demonstrate improved balance   3) Pt will tolerate 20 minutes of walking without an increase in LBP      Long Term Goals: 6 weeks   1) Pt will improve left SLS to 5 seconds or greater to improve safety of ambulation on unlevel surfaces  2) Pain will be no worse then 4/10 during all ADL related activities   3) Carson will walk community distances on level/unlevel surfaces with pain no worse then 4/10    Plan     Continue aquatic therapy to improve tolerance to exercise and LE strength    Jose Clark, PT

## 2020-07-08 ENCOUNTER — CLINICAL SUPPORT (OUTPATIENT)
Dept: REHABILITATION | Facility: HOSPITAL | Age: 50
End: 2020-07-08
Payer: MEDICARE

## 2020-07-08 DIAGNOSIS — M79.671 PAIN IN BOTH FEET: ICD-10-CM

## 2020-07-08 DIAGNOSIS — M79.672 PAIN IN BOTH FEET: ICD-10-CM

## 2020-07-08 DIAGNOSIS — Z74.09 IMPAIRED FUNCTIONAL MOBILITY, BALANCE, AND ENDURANCE: ICD-10-CM

## 2020-07-08 PROCEDURE — 97113 AQUATIC THERAPY/EXERCISES: CPT | Mod: PO | Performed by: PHYSICAL THERAPIST

## 2020-07-08 NOTE — PROGRESS NOTES
Physical Therapy Aquatic Treatment Note     Name: Carson Rivers  Clinic Number: [id number]    Therapy Diagnosis:   Encounter Diagnoses   Name Primary?    Pain in both feet     Impaired functional mobility, balance, and endurance      Physician: Farrukh Cleveland MD  Visit Date: 7/8/2020  Physician Orders: Aquatic Therapy   Medical Diagnosis from Referral: Foot pain   Evaluation Date: 12/30/2019  Authorization Period Expiration: 08/12/2020  Plan of Care Expiration: 07/17/2020  Visit # / Visits authorized: 18/20     Time In1:50 PM  Time Out:2:30 PM  Total Billable Time: 40 minutes     Precautions: Standard      Subjective     Pt reports that his knee is feeling better today, but still hurts if he moves it the wrong way    he was compliant with home exercise program given last session.     Pain: 5/10   Location:  B feet     Objective     Carson received aquatic exercises to develop strength, endurance, ROM, flexibility, posture and core stabilization for 40 minutes including:    Treadmill warm up 2 min x 4 directions    Stretches 2 x 20 sec   HSS  Quad    LE exs 20x each 2#  Mini Squat with QS  Heel Raise with GS   Hip flex/ext  Hip ABD/ADD  Hip Circles CW/CCW  Lunges Side/FWD   SLS 3 x 30 sec   Step-ups - NP     Endurance 5 min  Bicycle     Home Exercises Provided and Patient Education Provided     Education provided:   - progress towards goals   - role of therapy     Written Home Exercises Provided: Patient was not issued HEP for pool.  Exercises were reviewed and Carson was able to demonstrate them prior to the end of the session.   Pt received a written copy of exercises to perform at home.     Carson demonstrated good  understanding of the education provided.     Assessment   Carson did well with aquatic exercises today.He will be ready for progression to land based activities next visit.      Carson is progressing fair  towards his goals today.   Pt prognosis is Good.     Pt's spiritual, cultural and educational  needs considered and pt agreeable to plan of care and goals.    Anticipated barriers to physical therapy: none    Goals:     Short Term Goals: 3 weeks   1) Pt will be I with established HEP   2) SLS on the right LE will be 10 seconds or longer to demonstrate improved balance   3) Pt will tolerate 20 minutes of walking without an increase in LBP      Long Term Goals: 6 weeks   1) Pt will improve left SLS to 5 seconds or greater to improve safety of ambulation on unlevel surfaces  2) Pain will be no worse then 4/10 during all ADL related activities   3) Carson will walk community distances on level/unlevel surfaces with pain no worse then 4/10    Plan     Continue aquatic therapy to improve tolerance to exercise and LE strength    Jose Clark, PT

## 2020-07-22 ENCOUNTER — CLINICAL SUPPORT (OUTPATIENT)
Dept: REHABILITATION | Facility: HOSPITAL | Age: 50
End: 2020-07-22
Payer: MEDICARE

## 2020-07-22 ENCOUNTER — TELEPHONE (OUTPATIENT)
Dept: CARDIOLOGY | Facility: CLINIC | Age: 50
End: 2020-07-22

## 2020-07-22 DIAGNOSIS — Z74.09 IMPAIRED FUNCTIONAL MOBILITY, BALANCE, AND ENDURANCE: ICD-10-CM

## 2020-07-22 DIAGNOSIS — M79.672 PAIN IN BOTH FEET: ICD-10-CM

## 2020-07-22 DIAGNOSIS — M79.671 PAIN IN BOTH FEET: ICD-10-CM

## 2020-07-22 PROCEDURE — 97110 THERAPEUTIC EXERCISES: CPT | Mod: PO | Performed by: PHYSICAL THERAPIST

## 2020-07-22 NOTE — PROGRESS NOTES
Physical Therapy Aquatic Treatment Note     Name: Carson Rivers  Clinic Number: [id number]    Therapy Diagnosis:   Encounter Diagnoses   Name Primary?    Pain in both feet     Impaired functional mobility, balance, and endurance      Physician: Farrukh Cleveland MD  Visit Date: 7/22/2020  Physician Orders: Aquatic Therapy   Medical Diagnosis from Referral: Foot pain   Evaluation Date: 12/30/2019  Authorization Period Expiration: 08/12/2020  Plan of Care Expiration: 07/17/2020  Visit # / Visits authorized: 19/20     Time In1:50 PM  Time Out: 2:18 PM  Total Billable Time: 28 minutes     Precautions: Standard      Subjective     Pt reports that his legs are tight today. I haven't done much exercise over the last 2 weeks   he was compliant with home exercise program given last session.     Pain: 5/10   Location:  B feet     Objective     Carson received therapeutic exercises to develop strength, endurance, ROM, flexibility, posture and core stabilization for 40 minutes including:    Treadmill warm up  5 min     GSS x 2 min incline  HSS 3 x 20 sec at stairs     LE exs 20x each   Step ups   Mini Squat  Precor leg press 70#   Precor leg press SL 30#   Standing hip abduction B   Standing hip extension B     Home Exercises Provided and Patient Education Provided     Education provided:   - progress towards goals   - role of therapy     Written Home Exercises Provided: Patient was not issued HEP for pool.  Exercises were reviewed and Carson was able to demonstrate them prior to the end of the session.   Pt received a written copy of exercises to perform at home.     Carson demonstrated good  understanding of the education provided.     Assessment   Carson fatigued quickly with land based strengthening exercises. He will benefit from continued progression of strengthening to promote maximal functional independence      Carson is progressing fair  towards his goals today.   Pt prognosis is Good.     Pt's spiritual, cultural and  educational needs considered and pt agreeable to plan of care and goals.    Anticipated barriers to physical therapy: none    Goals:     Short Term Goals: 3 weeks   1) Pt will be I with established HEP   2) SLS on the right LE will be 10 seconds or longer to demonstrate improved balance   3) Pt will tolerate 20 minutes of walking without an increase in LBP      Long Term Goals: 6 weeks   1) Pt will improve left SLS to 5 seconds or greater to improve safety of ambulation on unlevel surfaces  2) Pain will be no worse then 4/10 during all ADL related activities   3) Carson will walk community distances on level/unlevel surfaces with pain no worse then 4/10    Plan     Continue aquatic therapy to improve tolerance to exercise and LE strength    Jose Clark, PT

## 2020-07-29 ENCOUNTER — CLINICAL SUPPORT (OUTPATIENT)
Dept: REHABILITATION | Facility: HOSPITAL | Age: 50
End: 2020-07-29
Payer: MEDICARE

## 2020-07-29 DIAGNOSIS — R53.1 DECREASED STRENGTH: ICD-10-CM

## 2020-07-29 DIAGNOSIS — M79.18 MYOFASCIAL MUSCLE PAIN: ICD-10-CM

## 2020-07-29 DIAGNOSIS — M54.16 LUMBAR RADICULOPATHY: ICD-10-CM

## 2020-07-29 PROCEDURE — 97110 THERAPEUTIC EXERCISES: CPT | Mod: PO,CQ

## 2020-07-29 NOTE — PROGRESS NOTES
Physical Therapy Aquatic Treatment Note     Name: Carson Rivers  Clinic Number: [id number]    Therapy Diagnosis:   Encounter Diagnoses   Name Primary?    Lumbar radiculopathy     Decreased strength     Myofascial muscle pain      Physician: Farrukh Cleveland MD  Visit Date: 7/29/2020  Physician Orders: Aquatic Therapy   Medical Diagnosis from Referral: Foot pain   Evaluation Date: 12/30/2019  Authorization Period Expiration: 08/12/2020  Plan of Care Expiration: 07/17/2020  Visit # / Visits authorized: 19/20     Time In1:32 PM  Time Out: 2:18 PM  Total Billable Time: 45 minutes     Precautions: Standard      Subjective     Pt reports that he is w/o c/o pn today, however states that he thinks he over did it during his last appoint and had increased pn which caused him to cancel his next appoint.    he was compliant with home exercise program given last session.     Pain: 0/10   Location:  B feet     Objective     Carson received therapeutic exercises to develop strength, endurance, ROM, flexibility, posture and core stabilization for 40 minutes including:    Treadmill warm up  5 min     GSS x 2 min incline  HSS 2 min at stairs   Ankle DF/PF, IV/EV red t-band 20x each  Stat bike 5 min  Towel crunch 2min  Golf ball 2 min  Ankle sweeps IV/EV 2min  LE exs 20x each   Step ups  NP  Mini Squat NP  Precor leg press 70#   Precor leg press SL 30#   Standing hip abduction B NP  Standing hip extension B NP    Home Exercises Provided and Patient Education Provided     Education provided:   - progress towards goals   - role of therapy     Written Home Exercises Provided: Patient was not issued HEP for pool.  Exercises were reviewed and Carson was able to demonstrate them prior to the end of the session.   Pt received a written copy of exercises to perform at home.     Carson demonstrated good  understanding of the education provided.     Assessment   Carson mayela todays tx with focus on foot and ankle activities due to above report of  response to last tx. His L foot active ROM is limited but he does show trace contraction for toe curl and EV. He continues to fatigue quickly and lacks motivation for  strengthening exercises. He will benefit from continued progression of strengthening to promote maximal functional independence      Carson is progressing fair  towards his goals today.   Pt prognosis is Good.     Pt's spiritual, cultural and educational needs considered and pt agreeable to plan of care and goals.    Anticipated barriers to physical therapy: none    Goals:     Short Term Goals: 3 weeks   1) Pt will be I with established HEP   2) SLS on the right LE will be 10 seconds or longer to demonstrate improved balance   3) Pt will tolerate 20 minutes of walking without an increase in LBP      Long Term Goals: 6 weeks   1) Pt will improve left SLS to 5 seconds or greater to improve safety of ambulation on unlevel surfaces  2) Pain will be no worse then 4/10 during all ADL related activities   3) Carson will walk community distances on level/unlevel surfaces with pain no worse then 4/10    Plan     Continue aquatic therapy to improve tolerance to exercise and LE strength    Ruperto Eng, PTA

## 2020-07-31 ENCOUNTER — CLINICAL SUPPORT (OUTPATIENT)
Dept: REHABILITATION | Facility: HOSPITAL | Age: 50
End: 2020-07-31
Payer: MEDICARE

## 2020-07-31 DIAGNOSIS — M79.671 PAIN IN BOTH FEET: ICD-10-CM

## 2020-07-31 DIAGNOSIS — Z74.09 IMPAIRED FUNCTIONAL MOBILITY, BALANCE, AND ENDURANCE: ICD-10-CM

## 2020-07-31 DIAGNOSIS — M79.672 PAIN IN BOTH FEET: ICD-10-CM

## 2020-07-31 PROCEDURE — 97110 THERAPEUTIC EXERCISES: CPT | Mod: PO | Performed by: PHYSICAL THERAPIST

## 2020-07-31 NOTE — PROGRESS NOTES
Physical Therapy Aquatic Treatment Note     Name: Carson Rivers  Clinic Number: [id number]    Therapy Diagnosis:   Encounter Diagnoses   Name Primary?    Pain in both feet     Impaired functional mobility, balance, and endurance      Physician: Farrukh Cleveland MD  Visit Date: 7/31/2020  Physician Orders: Aquatic Therapy   Medical Diagnosis from Referral: Foot pain   Evaluation Date: 12/30/2019  Authorization Period Expiration: 08/12/2020  Plan of Care Expiration: 07/17/2020  Visit # / Visits authorized: 3/12 on current authorization  29th visit total      Time In 2:05 PM  Time Out: 2:45 PM  Total Billable Time: 40 minutes     Precautions: Standard      Subjective     Pt reports that he is sore today from what he did at the house yesterday.     Pain: 0/10   Location:  B feet     Objective     Carson received therapeutic exercises to develop strength, endurance, ROM, flexibility, posture and core stabilization for 40 minutes including:    Recumbent bike warm up  5 min     GSS x 2 min incline  HSS 2 min at stairs   Ankle DF/PF, IV/EV red t-band 20x each  Towel crunch 2min  Golf ball 2 min  Ankle sweeps IV/EV 2min  LE exs 20x each   Step ups  20  Mini Squat NP  Precor leg press 70#   Precor leg press SL 30#   Standing hip abduction B 2 x 10 ea   Standing hip extension B 2 x 10 ea     Home Exercises Provided and Patient Education Provided     Education provided:   - progress towards goals   - role of therapy     Written Home Exercises Provided: Patient was not issued HEP for pool.  Exercises were reviewed and Carson was able to demonstrate them prior to the end of the session.   Pt received a written copy of exercises to perform at home.     Carson demonstrated good  understanding of the education provided.     Assessment   Carson was able to resume some of his functional LE strengthening activities today without an increase in pain levels. Left ankle inversion/eversion/dorsiflexion remains greatly limited.      Carson is  progressing fair  towards his goals today.   Pt prognosis is Good.     Pt's spiritual, cultural and educational needs considered and pt agreeable to plan of care and goals.    Anticipated barriers to physical therapy: none    Goals:     Short Term Goals: 3 weeks   1) Pt will be I with established HEP   2) SLS on the right LE will be 10 seconds or longer to demonstrate improved balance   3) Pt will tolerate 20 minutes of walking without an increase in LBP      Long Term Goals: 6 weeks   1) Pt will improve left SLS to 5 seconds or greater to improve safety of ambulation on unlevel surfaces  2) Pain will be no worse then 4/10 during all ADL related activities   3) Carson will walk community distances on level/unlevel surfaces with pain no worse then 4/10    Plan     Continue aquatic therapy to improve tolerance to exercise and LE strength    Jose Clark, PT

## 2020-08-12 ENCOUNTER — CLINICAL SUPPORT (OUTPATIENT)
Dept: REHABILITATION | Facility: HOSPITAL | Age: 50
End: 2020-08-12
Payer: MEDICARE

## 2020-08-12 DIAGNOSIS — Z74.09 IMPAIRED FUNCTIONAL MOBILITY, BALANCE, AND ENDURANCE: ICD-10-CM

## 2020-08-12 DIAGNOSIS — M79.671 PAIN IN BOTH FEET: ICD-10-CM

## 2020-08-12 DIAGNOSIS — M79.672 PAIN IN BOTH FEET: ICD-10-CM

## 2020-08-12 PROCEDURE — 97110 THERAPEUTIC EXERCISES: CPT | Mod: PO | Performed by: PHYSICAL THERAPIST

## 2020-08-12 NOTE — PLAN OF CARE
[facility]  Physical Therapy Update    Name: Carson Rivers  Clinic Number: [id number]    Therapy Diagnosis:   Encounter Diagnoses   Name Primary?    Pain in both feet     Impaired functional mobility, balance, and endurance      Physician: Farrukh Cleveland MD    Visit Date: 3/18/2020  Physician Orders: Aquatic Therapy   Medical Diagnosis from Referral: Foot pain   Evaluation Date: 12/30/2019  Authorization Period Expiration: 12/31/2019  Plan of Care Expiration: 07/17/2020  Visit # / Visits authorized: 4/ 12 30th total visit    Time In: 10:15 AM    Time Out: 10:35 AM  Total Billable Time: 24 minutes    Precautions: Standard    Subjective   Date of onset: March 2016; has worsened over last several months  History of current condition - Carson reports: A recent bout of physical therapy that was greatly beneficial. However, he continues to have some weakness in the hips and lower extremities and difficulty walking for long distances. His most recent episode of therapy was land based. He is most worried about his leg strength and being able to walk for a longer period of time.     12/30/20: Carson reports that overall, he feels much better. It feels like my balance is improving and I am getting stronger in my hips. I still have some pain, but it isn't as bad as it has been. I am still concerned about my left leg getting smaller.     3/2/20: Carson reports that he has been able to tell a significant difference since starting aquatic therapy. I am able to regain my balance when I become off balance. I am also walking better on unlevel surfaces. I want to continue therapy and transition to a land based program to help me move towards a gym based setting.     05/19/20: Carson reports he feels like he has regressed since his last PT treatment. He reports he feels like his hips are weaker. He states his foot pain 6/10 is about a 6/10 He has not been able to get into the pool because the weather has  "been to cold. He got new shoes, which has helped with his pain.     06/19/20: Carson has had 6 visits since his last reassessment; 4 aquatic and 2 land based visits. He feels like he has regressed since being out of therapy due to the Covid pandemic. He does feel as if he is starting to make some gains towards his pre-covid status. He ordered a bike and is awaiting delivery. He does feel stronger overall and his pain levels remain variable. He is adamant that an additional few sessions of aquatic therapy will be beneficial in regaining pre-covid status. He did report significant soreness following back to back land based sessions.     08/12/20: Carson reports that he has been "struggling" and feeling fatigued following land based sessions. His pain levels are variable and some days are better then others. He notices the pain the most when he misses a long duration of time. He reports feeling stronger and able to do more. I have been able to do some work off of a ladder.     Past Medical History:   Diagnosis Date    Hyperlipidemia     Hypertension      Carson Rivers  has no past surgical history on file.    Carson has a current medication list which includes the following prescription(s): alprazolam, amlodipine, buprenorphine hcl, famotidine, gabapentin, labetalol, meloxicam, and spironolactone.    Review of patient's allergies indicates:  No Known Allergies     Imaging: No recent imaging     Prior Therapy: Multiple episodes of land based physical therapy for back and shoulder   Social History:  lives with their family  Occupation: Currently on disability   Prior Level of Function: Significant limitations with walking for long distances   Current Level of Function: Some improvement in walking endurance, but still limitations. He has more difficulty walking on uneven terrain     Pain:  Current 0/10, worst 5/10, best 0/10   Location: right foot  Description: Aching  Aggravating Factors: Standing and Walking  Easing " Factors: rest and elevation    Pts goals: To improve lower extremity strength and to be able to walk for longer distances     Objective   Mental status: alert, oriented x3  Posture/ Alignment: Fair, Maintains flexed posture with an increased thoracic kyphosis    GAIT DEVIATIONS: Carson amb with decreased daquan, decreased step length and decreased gait speed .    ROM:   AROM % Limiation Comment   Flexion: 10%     Extension: 50%     Lat Flex R: Joint line      Lat Flex L: Joint line      Rotation R: 25%     Rotation L: 25%     *pain    Strength: Dermatomes:   Right Left Comment   L2 intact intact    L3 intact intact    L4 intact intact    L5 intact intact    S1 intact impaired - minimum    S2 intact intact    Saddle intact intact      Myotomes:   Right Left Comment   Hip flexion (L2-3): 5/5 5/5    Knee extension (L3-4): 5/5 5/5    DF (L4-5): 5/5 4-/5    Great Toe Ext (L5-S1): 5/5 trace    Great Toe Flex (S1-S2): 5/5 trace      Hip Abductors: 5/5 bilaterally   Hip extensors: 5/5 bilaterally     DTR:   Right Left Comment   Patellar (L3-4) 0 0    Achilles (S1) 1+ 1+        Functional Tests (* indicates w/ pain)   SLS: R: 12 seconds fair balance strategies  L: 6 seconds stepping strategy to regain balance   SLR: (-) for pain   Beto test: (+) bilaterally     Palpation:  No TTP elicited     Pt/family was provided educational information, including: role of PT, goals for PT, scheduling - pt verbalized understanding. Discussed insurance plan with pt.     CMS Impairment/Limitation/Restriction for FOTO Foot Survey    Therapist reviewed FOTO scores for Carson Rivers on 8/12/2020.   FOTO documents entered into [employer] - see Media section.    Limitation Score: 42%  Category: Mobility    Current : CK = at least 40% but < 60% impaired, limited or restricted         Home Exercises and Patient Education Provided    Education provided:   - POC  - Benefits of aquatic therapy     Written Home Exercises Provided: No HEP issued  .  Exercises were reviewed and Carson was able to demonstrate them prior to the end of the session.  Carson demonstrated good  understanding of the education provided.       Assessment   Pt presents with a medical diagnosis of foot pain. Since beginning PT, Carson has been seen 30 times since initial evaluation on 12/30/2019. Carson has made gains with ADL abilities, but still has varying levels of pain that at times limits function He continues to have decreased lumbar ROM. His SL balance has improved and pain has decreased since returning to physical therapy. We will plan to continue PT with his remaining 8 visits and all of them will be land based.     Pt prognosis is Good.   Pt will benefit from skilled outpatient Physical Therapy to address the deficits stated above and in the chart below, provide pt/family education, and to maximize pt's level of independence.     Plan of care discussed with patient: Yes  Pt's spiritual, cultural and educational needs considered and patient is agreeable to the plan of care and goals as stated below:     Anticipated Barriers for therapy: None at this time     Medical Necessity is demonstrated by the following  History  Co-morbidities and personal factors that may impact the plan of care Co-morbidities:   high BMI and prior lumbar surgery    Personal Factors:   lifestyle     moderate   Examination  Body Structures and Functions, activity limitations and participation restrictions that may impact the plan of care Body Regions:   back  lower extremities    Body Systems:    gross symmetry  ROM  strength  balance  gait    Participation Restrictions:       Activity limitations:   Learning and applying knowledge  no deficits    General Tasks and Commands  no deficits    Communication  no deficits    Mobility  walking    Self care  washing oneself (bathing, drying, washing hands)  caring for body parts (brushing teeth, shaving, grooming)    Domestic Life  doing house work (cleaning house,  washing dishes, laundry)    Interactions/Relationships  no deficits    Life Areas  employment    Community and Social Life  community life  recreation and leisure         high   Clinical Presentation evolving clinical presentation with changing clinical characteristics moderate   Decision Making/ Complexity Score: moderate     Goals:  Short Term Goals: 3 weeks   1) Pt will be I with established HEP - met 1/30/20  2) SLS on the right LE will be 10 seconds or longer to demonstrate improved balance - met 1/30/20  3) Pt will tolerate 20 minutes of walking without an increase in LBP - in progress     Long Term Goals: 6 weeks   1) Pt will improve left SLS to 5 seconds or greater to improve safety of ambulation on unlevel surfaces - met 1/30/20  2) Pain will be no worse then 4/10 during all ADL related activities - in progress   3) Carson will walk community distances on level/unlevel surfaces with pain no worse then 4/10 - in progress  4) Pt will maintain L SLS for 10 seconds - progressing, not met   5) Pt will return to exercising in a gym based setting withou 2/10 pain or less - progressing, not met       Plan   Plan of care Certification: 8/12/2020 to 09/23/2020.    Outpatient Physical Therapy 2 times weekly for 12 visits to include the following interventions: Aquatic Therapy, Electrical Stimulation prn, Manual Therapy, Neuromuscular Re-ed, Patient Education, Therapeutic Activites and Therapeutic Exercise.     Jose Clark, PT

## 2020-08-12 NOTE — PROGRESS NOTES
Physical Therapy Aquatic Treatment Note     Name: Carson Rivers  Clinic Number: [id number]    Therapy Diagnosis:   Encounter Diagnoses   Name Primary?    Pain in both feet     Impaired functional mobility, balance, and endurance      Physician: Farrukh Cleveland MD  Visit Date: 8/12/2020  Physician Orders: Aquatic Therapy   Medical Diagnosis from Referral: Foot pain   Evaluation Date: 12/30/2019  Authorization Period Expiration: 12/31/2020  Plan of Care Expiration: 09/23/2020  Visit # / Visits authorized: 3/12 on current authorization  29th visit total      Time In 10:15 AM  Time Out:  10:54 AM  Total Billable Time: 39minutes     Precautions: Standard      Subjective     Pt reports that he is sore today from what he did at the house yesterday.     Pain: 0/10   Location:  B feet     Objective     Carson received therapeutic exercises to develop strength, endurance, ROM, flexibility, posture and core stabilization for 39 minutes including:    Recumbent bike warm up  6 min     GSS x 2 min incline  HSS 2 min at stairs   Ankle DF/PF, IV/EV red t-band 20x each  Towel crunch 2min  Golf ball 2 min  Ankle sweeps IV/EV 2min  LE exs 20x each   Step ups  20  Mini Squat NP  Precor leg press 70#   Precor leg press SL 50#   Standing hip abduction B 2 x 10 ea   Standing hip extension B 2 x 10 ea     Home Exercises Provided and Patient Education Provided     Education provided:   - progress towards goals   - role of therapy     Written Home Exercises Provided: Patient was not issued HEP for pool.  Exercises were reviewed and Carson was able to demonstrate them prior to the end of the session.   Pt received a written copy of exercises to perform at home.     Carson demonstrated good  understanding of the education provided.     Assessment   Carson was able to resume some of his functional LE strengthening activities today without an increase in pain levels. Left ankle inversion/eversion/dorsiflexion remains greatly limited. Function  slowly improving.      Carson is progressing fair  towards his goals today.   Pt prognosis is Good.     Pt's spiritual, cultural and educational needs considered and pt agreeable to plan of care and goals.    Anticipated barriers to physical therapy: none    Goals:     Short Term Goals: 3 weeks   1) Pt will be I with established HEP   2) SLS on the right LE will be 10 seconds or longer to demonstrate improved balance   3) Pt will tolerate 20 minutes of walking without an increase in LBP      Long Term Goals: 6 weeks   1) Pt will improve left SLS to 5 seconds or greater to improve safety of ambulation on unlevel surfaces  2) Pain will be no worse then 4/10 during all ADL related activities   3) Carson will walk community distances on level/unlevel surfaces with pain no worse then 4/10    Plan     Continue aquatic therapy to improve tolerance to exercise and LE strength    Jose Clark, PT

## 2020-08-14 ENCOUNTER — CLINICAL SUPPORT (OUTPATIENT)
Dept: REHABILITATION | Facility: HOSPITAL | Age: 50
End: 2020-08-14
Payer: MEDICARE

## 2020-08-14 DIAGNOSIS — M79.671 PAIN IN BOTH FEET: ICD-10-CM

## 2020-08-14 DIAGNOSIS — M79.672 PAIN IN BOTH FEET: ICD-10-CM

## 2020-08-14 DIAGNOSIS — Z74.09 IMPAIRED FUNCTIONAL MOBILITY, BALANCE, AND ENDURANCE: ICD-10-CM

## 2020-08-14 PROCEDURE — 97110 THERAPEUTIC EXERCISES: CPT | Mod: PO | Performed by: PHYSICAL THERAPIST

## 2020-08-14 NOTE — PROGRESS NOTES
Physical Therapy Aquatic Treatment Note     Name: Carson Rivers  Clinic Number: [id number]    Therapy Diagnosis:   Encounter Diagnoses   Name Primary?    Pain in both feet     Impaired functional mobility, balance, and endurance      Physician: Farrukh Cleveland MD  Visit Date: 8/14/2020  Physician Orders: Aquatic Therapy   Medical Diagnosis from Referral: Foot pain   Evaluation Date: 12/30/2019  Authorization Period Expiration: 12/31/2020  Plan of Care Expiration: 09/23/2020  Visit # / Visits authorized: 5/12 on current authorization  29th visit total      Time In 10:32 AM  Time Out:  11:00 AM  Total Billable Time: 20 minutes     Precautions: Standard      Subjective     Pt reports that he has a headache today and he did something to his right foot yesterday.     Pain: 0/10   Location:  B feet     Objective     Carson received therapeutic exercises to develop strength, endurance, ROM, flexibility, posture and core stabilization for 20 minutes including:    Recumbent bike warm up  6 min     GSS x 2 min incline  HSS 2 min at stairs   Ankle DF/PF, IV/EV red t-band 20x each - NP     LE exs 20x each   Step ups  20  Mini Squat NP  Precor leg press 70#   Precor leg press SL 50#   Standing hip abduction B 2 x 10 ea - NP  Standing hip extension B 2 x 10 ea -NP    Home Exercises Provided and Patient Education Provided     Education provided:   - progress towards goals   - role of therapy     Written Home Exercises Provided: Patient was not issued HEP for pool.  Exercises were reviewed and Carson was able to demonstrate them prior to the end of the session.   Pt received a written copy of exercises to perform at home.     Carson demonstrated good  understanding of the education provided.     Assessment   Carson continues to report an intolerance to land based physical therapy. However, he doesn't display outward signs to suggest this. Continued aquatic therapy is not appropriate at this time. He is appropriate for progression  with a land based program and ultimately moving towards discharge.      Carson is progressing fair  towards his goals today.   Pt prognosis is Good.     Pt's spiritual, cultural and educational needs considered and pt agreeable to plan of care and goals.    Anticipated barriers to physical therapy: none    Goals:     Short Term Goals: 3 weeks   1) Pt will be I with established HEP   2) SLS on the right LE will be 10 seconds or longer to demonstrate improved balance   3) Pt will tolerate 20 minutes of walking without an increase in LBP      Long Term Goals: 6 weeks   1) Pt will improve left SLS to 5 seconds or greater to improve safety of ambulation on unlevel surfaces  2) Pain will be no worse then 4/10 during all ADL related activities   3) Carson will walk community distances on level/unlevel surfaces with pain no worse then 4/10    Plan     Continue aquatic therapy to improve tolerance to exercise and LE strength    Jose Clark, PT

## 2020-08-17 ENCOUNTER — CLINICAL SUPPORT (OUTPATIENT)
Dept: REHABILITATION | Facility: HOSPITAL | Age: 50
End: 2020-08-17
Payer: MEDICARE

## 2020-08-17 DIAGNOSIS — Z74.09 IMPAIRED FUNCTIONAL MOBILITY, BALANCE, AND ENDURANCE: ICD-10-CM

## 2020-08-17 DIAGNOSIS — M79.672 PAIN IN BOTH FEET: ICD-10-CM

## 2020-08-17 DIAGNOSIS — M79.671 PAIN IN BOTH FEET: ICD-10-CM

## 2020-08-17 PROCEDURE — 97110 THERAPEUTIC EXERCISES: CPT | Mod: PO | Performed by: PHYSICAL THERAPIST

## 2020-08-17 NOTE — PROGRESS NOTES
Physical Therapy Aquatic Treatment Note     Name: Carson Rivers  Clinic Number: [id number]    Therapy Diagnosis:   Encounter Diagnoses   Name Primary?    Pain in both feet     Impaired functional mobility, balance, and endurance      Physician: Farrukh Cleveland MD  Visit Date: 8/17/2020  Physician Orders: Aquatic Therapy   Medical Diagnosis from Referral: Foot pain   Evaluation Date: 12/30/2019  Authorization Period Expiration: 12/31/2020  Plan of Care Expiration: 09/23/2020  Visit # / Visits authorized: 6/12 on current authorization  29th visit total      Time In 1:15 PM  Time Out:  1:59  PM  Total Billable Time: 34 minutes     Precautions: Standard      Subjective     Pt reports that his feet are really bothering him today, left is worse     Pain: 7/10   Location:  B feet     Objective     Carson received therapeutic exercises to develop strength, endurance, ROM, flexibility, posture and core stabilization for 35 minutes including:    Recumbent bike warm up  6 min     GSS x 2 min incline  HSS 2 min at stairs   Ankle DF/PF, IV/EV red t-band 20x each     LE exs 20x each   Step ups  20  Mini Squat 20x  Precor leg press 70#   Precor leg press SL 50#   Standing hip abduction B 2 x 10 ea   Standing hip extension B 2 x 10 ea    Carson received ice for 10 minutes to the lumbar spine to reduce soreness    Home Exercises Provided and Patient Education Provided     Education provided:   - progress towards goals   - role of therapy     Written Home Exercises Provided: Patient was not issued HEP for pool.  Exercises were reviewed and Carson was able to demonstrate them prior to the end of the session.   Pt received a written copy of exercises to perform at home.     Carson demonstrated good  understanding of the education provided.     Assessment   Carson was able to perform all requested exercises without a significant exacerbation of symptoms. Pain remains variable at  This time.      Carson is progressing fair  towards his  goals today.   Pt prognosis is Good.     Pt's spiritual, cultural and educational needs considered and pt agreeable to plan of care and goals.    Anticipated barriers to physical therapy: none    Goals:     Short Term Goals: 3 weeks   1) Pt will be I with established HEP   2) SLS on the right LE will be 10 seconds or longer to demonstrate improved balance   3) Pt will tolerate 20 minutes of walking without an increase in LBP      Long Term Goals: 6 weeks   1) Pt will improve left SLS to 5 seconds or greater to improve safety of ambulation on unlevel surfaces  2) Pain will be no worse then 4/10 during all ADL related activities   3) Carson will walk community distances on level/unlevel surfaces with pain no worse then 4/10    Plan     Continue aquatic therapy to improve tolerance to exercise and LE strength    Jose Clark, PT

## 2020-08-19 ENCOUNTER — CLINICAL SUPPORT (OUTPATIENT)
Dept: REHABILITATION | Facility: HOSPITAL | Age: 50
End: 2020-08-19
Payer: MEDICARE

## 2020-08-19 DIAGNOSIS — M79.671 PAIN IN BOTH FEET: ICD-10-CM

## 2020-08-19 DIAGNOSIS — Z74.09 IMPAIRED FUNCTIONAL MOBILITY, BALANCE, AND ENDURANCE: ICD-10-CM

## 2020-08-19 DIAGNOSIS — M79.672 PAIN IN BOTH FEET: ICD-10-CM

## 2020-08-19 PROCEDURE — 97110 THERAPEUTIC EXERCISES: CPT | Mod: PO | Performed by: PHYSICAL THERAPIST

## 2020-08-19 NOTE — PROGRESS NOTES
Physical Therapy Aquatic Treatment Note     Name: Carson Rivers  Clinic Number: [id number]    Therapy Diagnosis:   Encounter Diagnoses   Name Primary?    Pain in both feet     Impaired functional mobility, balance, and endurance      Physician: Farrukh Cleveland MD  Visit Date: 8/19/2020  Physician Orders: Aquatic Therapy   Medical Diagnosis from Referral: Foot pain   Evaluation Date: 12/30/2019  Authorization Period Expiration: 12/31/2020  Plan of Care Expiration: 09/23/2020  Visit # / Visits authorized: 6/12 on current authorization  29th visit total      Time In 1:23 PM  Time Out:  1:58 PM  Total Billable Time: 35 minutes     Precautions: Standard      Subjective     Pt reports that he was having some back pain yesterday after fixing a water leak.     Pain: 5/10   Location:  B feet     Objective     Carson received therapeutic exercises to develop strength, endurance, ROM, flexibility, posture and core stabilization for 35 minutes including:    Recumbent bike warm up  6 min     GSS x 2 min incline  HSS 2 min at stairs   Ankle DF/PF, IV/EV red t-band 20x each - NP    LE exs 20x each   Precor hip abd/add 30#/40#   Mini Squat 20x - NP  Precor leg press 70#   Precor leg press SL 50#   Standing hip abduction B 2 x 10 ea 2#  Standing hip extension B 2 x 10 ea 2#    Carson received ice for  minutes to the lumbar spine to reduce soreness    Home Exercises Provided and Patient Education Provided     Education provided:   - progress towards goals   - role of therapy     Written Home Exercises Provided: Patient was not issued HEP for pool.  Exercises were reviewed and Carson was able to demonstrate them prior to the end of the session.   Pt received a written copy of exercises to perform at home.     Carson demonstrated good  understanding of the education provided.     Assessment   Carson was able to perform new hip strengthening exercises without an increase in pain levels. He is moving towards D/C. He remains apprehensive  to be discharged from physical therapy     Carson is progressing fair  towards his goals today.   Pt prognosis is Good.     Pt's spiritual, cultural and educational needs considered and pt agreeable to plan of care and goals.    Anticipated barriers to physical therapy: none    Goals:     Short Term Goals: 3 weeks   1) Pt will be I with established HEP   2) SLS on the right LE will be 10 seconds or longer to demonstrate improved balance   3) Pt will tolerate 20 minutes of walking without an increase in LBP      Long Term Goals: 6 weeks   1) Pt will improve left SLS to 5 seconds or greater to improve safety of ambulation on unlevel surfaces  2) Pain will be no worse then 4/10 during all ADL related activities   3) Carson will walk community distances on level/unlevel surfaces with pain no worse then 4/10    Plan     Continue aquatic therapy to improve tolerance to exercise and LE strength    Jose Clark, PT

## 2020-09-02 ENCOUNTER — DOCUMENTATION ONLY (OUTPATIENT)
Dept: REHABILITATION | Facility: HOSPITAL | Age: 50
End: 2020-09-02

## 2020-09-09 DIAGNOSIS — M25.512 LEFT SHOULDER PAIN: Primary | ICD-10-CM

## 2020-09-09 DIAGNOSIS — M25.511 RIGHT SHOULDER PAIN: ICD-10-CM

## 2020-09-22 ENCOUNTER — OFFICE VISIT (OUTPATIENT)
Dept: CARDIOLOGY | Facility: CLINIC | Age: 50
End: 2020-09-22
Payer: MEDICARE

## 2020-09-22 VITALS
BODY MASS INDEX: 30.82 KG/M2 | HEIGHT: 71 IN | OXYGEN SATURATION: 96 % | WEIGHT: 220.13 LBS | DIASTOLIC BLOOD PRESSURE: 82 MMHG | SYSTOLIC BLOOD PRESSURE: 132 MMHG | HEART RATE: 83 BPM

## 2020-09-22 DIAGNOSIS — I10 ESSENTIAL HYPERTENSION: Primary | ICD-10-CM

## 2020-09-22 DIAGNOSIS — I35.8 AORTIC VALVE SCLEROSIS: Chronic | ICD-10-CM

## 2020-09-22 DIAGNOSIS — R06.02 SOB (SHORTNESS OF BREATH): ICD-10-CM

## 2020-09-22 DIAGNOSIS — Z82.49 FAMILY HISTORY OF CORONARY ARTERY DISEASE: ICD-10-CM

## 2020-09-22 PROCEDURE — 3008F BODY MASS INDEX DOCD: CPT | Mod: CPTII,S$GLB,, | Performed by: INTERNAL MEDICINE

## 2020-09-22 PROCEDURE — 99999 PR PBB SHADOW E&M-EST. PATIENT-LVL III: ICD-10-PCS | Mod: PBBFAC,,, | Performed by: INTERNAL MEDICINE

## 2020-09-22 PROCEDURE — 3075F SYST BP GE 130 - 139MM HG: CPT | Mod: CPTII,S$GLB,, | Performed by: INTERNAL MEDICINE

## 2020-09-22 PROCEDURE — 3075F PR MOST RECENT SYSTOLIC BLOOD PRESS GE 130-139MM HG: ICD-10-PCS | Mod: CPTII,S$GLB,, | Performed by: INTERNAL MEDICINE

## 2020-09-22 PROCEDURE — 99214 PR OFFICE/OUTPT VISIT, EST, LEVL IV, 30-39 MIN: ICD-10-PCS | Mod: S$GLB,,, | Performed by: INTERNAL MEDICINE

## 2020-09-22 PROCEDURE — 99999 PR PBB SHADOW E&M-EST. PATIENT-LVL III: CPT | Mod: PBBFAC,,, | Performed by: INTERNAL MEDICINE

## 2020-09-22 PROCEDURE — 3079F DIAST BP 80-89 MM HG: CPT | Mod: CPTII,S$GLB,, | Performed by: INTERNAL MEDICINE

## 2020-09-22 PROCEDURE — 3008F PR BODY MASS INDEX (BMI) DOCUMENTED: ICD-10-PCS | Mod: CPTII,S$GLB,, | Performed by: INTERNAL MEDICINE

## 2020-09-22 PROCEDURE — 99214 OFFICE O/P EST MOD 30 MIN: CPT | Mod: S$GLB,,, | Performed by: INTERNAL MEDICINE

## 2020-09-22 PROCEDURE — 3079F PR MOST RECENT DIASTOLIC BLOOD PRESSURE 80-89 MM HG: ICD-10-PCS | Mod: CPTII,S$GLB,, | Performed by: INTERNAL MEDICINE
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
## 2021-08-24 DIAGNOSIS — M25.552 BILATERAL HIP PAIN: ICD-10-CM

## 2021-08-24 DIAGNOSIS — M25.551 BILATERAL HIP PAIN: ICD-10-CM

## 2021-08-24 DIAGNOSIS — M54.50 LOW BACK PAIN: Primary | ICD-10-CM

## 2021-09-21 ENCOUNTER — CLINICAL SUPPORT (OUTPATIENT)
Dept: REHABILITATION | Facility: HOSPITAL | Age: 51
End: 2021-09-21
Payer: MEDICARE

## 2021-09-21 DIAGNOSIS — R26.89 IMPAIRED GAIT AND MOBILITY: ICD-10-CM

## 2021-09-21 PROCEDURE — 97161 PT EVAL LOW COMPLEX 20 MIN: CPT | Mod: PO | Performed by: PHYSICAL THERAPIST

## 2021-09-30 ENCOUNTER — CLINICAL SUPPORT (OUTPATIENT)
Dept: REHABILITATION | Facility: HOSPITAL | Age: 51
End: 2021-09-30
Payer: MEDICARE

## 2021-09-30 DIAGNOSIS — R26.89 IMPAIRED GAIT AND MOBILITY: ICD-10-CM

## 2021-09-30 DIAGNOSIS — R53.1 DECREASED STRENGTH: ICD-10-CM

## 2021-09-30 DIAGNOSIS — M79.672 PAIN IN BOTH FEET: ICD-10-CM

## 2021-09-30 DIAGNOSIS — M79.18 MYOFASCIAL MUSCLE PAIN: ICD-10-CM

## 2021-09-30 DIAGNOSIS — M79.671 PAIN IN BOTH FEET: ICD-10-CM

## 2021-09-30 DIAGNOSIS — M54.16 LUMBAR RADICULOPATHY: ICD-10-CM

## 2021-09-30 DIAGNOSIS — Z74.09 IMPAIRED FUNCTIONAL MOBILITY, BALANCE, AND ENDURANCE: ICD-10-CM

## 2021-09-30 PROCEDURE — 97113 AQUATIC THERAPY/EXERCISES: CPT | Mod: PO,CQ

## 2021-10-05 ENCOUNTER — CLINICAL SUPPORT (OUTPATIENT)
Dept: REHABILITATION | Facility: HOSPITAL | Age: 51
End: 2021-10-05
Payer: MEDICARE

## 2021-10-05 DIAGNOSIS — R26.89 IMPAIRED GAIT AND MOBILITY: Primary | ICD-10-CM

## 2021-10-05 PROCEDURE — 97113 AQUATIC THERAPY/EXERCISES: CPT | Mod: PO | Performed by: PHYSICAL THERAPIST

## 2021-10-06 ENCOUNTER — CLINICAL SUPPORT (OUTPATIENT)
Dept: REHABILITATION | Facility: HOSPITAL | Age: 51
End: 2021-10-06
Payer: MEDICARE

## 2021-10-06 DIAGNOSIS — R26.89 IMPAIRED GAIT AND MOBILITY: ICD-10-CM

## 2021-10-06 PROCEDURE — 97113 AQUATIC THERAPY/EXERCISES: CPT | Mod: PO,CQ

## 2021-10-13 ENCOUNTER — CLINICAL SUPPORT (OUTPATIENT)
Dept: REHABILITATION | Facility: HOSPITAL | Age: 51
End: 2021-10-13
Payer: MEDICARE

## 2021-10-13 DIAGNOSIS — R26.89 IMPAIRED GAIT AND MOBILITY: ICD-10-CM

## 2021-10-13 PROCEDURE — 97113 AQUATIC THERAPY/EXERCISES: CPT | Mod: KX,PO,CQ

## 2021-10-20 ENCOUNTER — CLINICAL SUPPORT (OUTPATIENT)
Dept: REHABILITATION | Facility: HOSPITAL | Age: 51
End: 2021-10-20
Payer: MEDICARE

## 2021-10-21 ENCOUNTER — DOCUMENTATION ONLY (OUTPATIENT)
Dept: REHABILITATION | Facility: HOSPITAL | Age: 51
End: 2021-10-21

## 2021-10-21 PROBLEM — R26.89 IMPAIRED GAIT AND MOBILITY: Status: RESOLVED | Noted: 2021-09-21 | Resolved: 2021-10-21

## 2021-10-29 PROBLEM — M79.671 PAIN IN BOTH FEET: Status: RESOLVED | Noted: 2019-12-30 | Resolved: 2021-10-29

## 2021-10-29 PROBLEM — M79.672 PAIN IN BOTH FEET: Status: RESOLVED | Noted: 2019-12-30 | Resolved: 2021-10-29

## 2021-10-29 PROBLEM — Z74.09 IMPAIRED FUNCTIONAL MOBILITY, BALANCE, AND ENDURANCE: Status: RESOLVED | Noted: 2019-12-30 | Resolved: 2021-10-29

## 2022-03-11 NOTE — TELEPHONE ENCOUNTER
Rtc and he req to wait and do labs in aug with his other labs scheduled for aug 14th. Told him that would me fine. He still has his order to take to [facility] that Shyann Alas NP gave to him. Cm  I looked at last clinic note: Labs: BMP, FLP, ALT fasting insulin with phone review. He will get these done at [facility]. He should have the order Shyann gave him. Will call back after 2:30pm as he requested. Cm  ----- Message from Mary Anne Cabello MA sent at 7/22/2020  1:32 PM CDT -----  Contact: SELF [phone]  Please call after 2:30 pm cause he will be in Physical Therapy until then.    Would like to speak with the nurse about his blood work that he need to have done.  He thinks it was order by Shyann Alas NP but not sure.  Please call.  Thanks        [id number] Exp Problem Focused - Mod. Complex

## 2022-07-13 NOTE — PROGRESS NOTES
Subjective:    Patient ID:  Carson Rivers is a 52 y.o. male who presents for evaluation of No chief complaint on file.      HPI: Mr. Carson Rivers presents to the clinic for follow up HTN and hyperlipidemia. He stated that he is doing ok; he denied any chest pain or SOB. He denied PRYOR with walking. He is anticipating back surgery in the near future.  His back pain that limits walking and movement.    CRF- smoking, family hx of CAD, HTN, HLP    Medications: he is not missing any doses. He is taking labetalol, amlodipine.  Sodium: he does add salt to foods,  he is not reading labels for sodium content. Potato chips, smoked ham.  Dietary Fats: fried fish and shrimp; prefers starchy vegetables; very seldom eats out.  Dietary Sugars: Dr. Pepper throughout the day-as much as 2 liters/day.  Exercise: he is limited by back pain. He tries to stay active.  He stated that he had to quit his job at [employer] ??? because he could not stand for 4 hours.  Tobacco: currently smoking 12 cigarettes/day. He is taking chantix.  Alcohol: no alcohol use     Weight: 106.1 kg (233 lb 14.5 oz) he states that his daily weights has been stable Body mass index is 32.62 kg/m².  Wt Readings from Last 3 Encounters:   07/14/22 106.1 kg (233 lb 14.5 oz)   09/22/20 99.8 kg (220 lb 1.6 oz)   06/12/20 99.3 kg (219 lb)     BP log: none; stated that it has been 120-150/70s at home.    Review of Systems   Constitutional: Negative for chills, decreased appetite, fever, night sweats, weight gain and weight loss.   HENT: Positive for hoarse voice (With mucus in throat. Stated that Xanax increases mucous in the throat.). Negative for congestion.    Cardiovascular: Negative for chest pain, claudication, cyanosis, dyspnea on exertion, irregular heartbeat, leg swelling, near-syncope, orthopnea, palpitations, paroxysmal nocturnal dyspnea and syncope.   Respiratory: Negative for cough, hemoptysis, shortness of breath, sputum production and wheezing.     Hematologic/Lymphatic: Negative for adenopathy and bleeding problem. Does not bruise/bleed easily.   Skin: Negative for color change and nail changes.   Musculoskeletal: Positive for arthritis (multiple joints and his back.) and back pain (Chronic; anticipating back surgery soon.).   Gastrointestinal: Negative for bloating, abdominal pain, change in bowel habit, heartburn, hematochezia, melena, nausea and vomiting.   Genitourinary: Negative for hematuria.   Neurological: Negative for dizziness and light-headedness.   Psychiatric/Behavioral: Negative for altered mental status.       Objective:   Physical Exam  Constitutional:       General: He is not in acute distress.     Appearance: He is well-developed. He is not diaphoretic.   HENT:      Head: Normocephalic and atraumatic.   Eyes:      General: No scleral icterus.     Conjunctiva/sclera: Conjunctivae normal.   Neck:      Thyroid: No thyromegaly.      Vascular: No JVD.      Trachea: No tracheal deviation.   Cardiovascular:      Rate and Rhythm: Normal rate and regular rhythm.      Pulses: Intact distal pulses.      Heart sounds: Murmur heard.    Harsh midsystolic murmur is present with a grade of 2/6 at the upper right sternal border radiating to the neck.    No friction rub. No gallop.   Pulmonary:      Effort: Pulmonary effort is normal. No respiratory distress.      Breath sounds: No stridor. Wheezing (Expiratory wheezes noted in right base.) present. No rhonchi or rales.      Comments: Lungs clear to auscultation bilaterally with the exception of expiratory wheezes noted in the right base posteriorly.  Chest:      Chest wall: No tenderness.   Abdominal:      General: Bowel sounds are normal. There is no distension.      Palpations: Abdomen is soft. There is no mass.      Tenderness: There is no abdominal tenderness. There is no guarding or rebound.   Musculoskeletal:         General: Normal range of motion.      Cervical back: Neck supple.   Lymphadenopathy:       Cervical: No cervical adenopathy.   Skin:     General: Skin is warm and dry.      Coloration: Skin is not pale.      Findings: No erythema or rash.      Comments: [place]   Neurological:      Mental Status: He is alert and oriented to person, place, and time.        Results for JODY PATEL (MRN [id number]) as of 4/26/2019 08:20   Ref. Range 4/2/2019 08:40   Alkaline Phosphatase Latest Ref Range: 55 - 135 U/L 99   PROTEIN TOTAL Latest Ref Range: 6.0 - 8.4 g/dL 7.5   Albumin Latest Ref Range: 3.5 - 5.2 g/dL 4.1   BILIRUBIN TOTAL Latest Ref Range: 0.1 - 1.0 mg/dL 0.4   Bilirubin, Direct Latest Ref Range: 0.1 - 0.3 mg/dL 0.2   AST Latest Ref Range: 10 - 40 U/L 33   ALT Latest Ref Range: 10 - 44 U/L 40   Triglycerides Latest Ref Range: 30 - 150 mg/dL 206 (H)   Cholesterol Latest Ref Range: 120 - 199 mg/dL 167   HDL Latest Ref Range: 40 - 75 mg/dL 25 (L)   HDL/Chol Ratio Latest Ref Range: 20.0 - 50.0 % 15.0 (L)   LDL Cholesterol Latest Ref Range: 63.0 - 159.0 mg/dL 100.8   Non-HDL Cholesterol Latest Units: mg/dL 142   Total Cholesterol/HDL Ratio Latest Ref Range: 2.0 - 5.0  6.7 (H)     Pharm NM Stress test 4/17/19: Impression: NORMAL MYOCARDIAL PERFUSION  1. The perfusion scan is free of evidence for myocardial ischemia or injury.   2. Resting wall motion is physiologic.   3. Resting LV function is normal.   4. The ventricular volumes are normal at rest and stress.   5. The extracardiac distribution of radioactivity is normal.   6. There was no previous study available to compare.    Echo 4/2/19: CONCLUSIONS     1 - Normal left ventricular systolic function (EF 60-65%).     2 - Normal left ventricular diastolic function.     3 - Normal right ventricular systolic function .     4 - Concentric hypertrophy.     Assessment:      1. Essential hypertension    2. Aortic valve sclerosis    3. Dyslipidemia    4. Class 1 obesity due to excess calories with serious comorbidity and body mass index (BMI) of 32.0 to 32.9 in adult     5. Preop cardiovascular exam    6. Tobacco dependence due to cigarettes        Plan:     Essential hypertension  -     Comprehensive Metabolic Panel; Future; Expected date: 07/15/2022  -     Magnesium, RBC; Future; Expected date: 07/15/2022  -     CRP, High Sensitivity; Future; Expected date: 07/14/2022  -     Nuclear Stress - 3rd Party; Future  -     Echo; Future    Aortic valve sclerosis  -     Echo; Future    Dyslipidemia  -     Lipid Panel; Future; Expected date: 07/15/2022  -     Comprehensive Metabolic Panel; Future; Expected date: 07/15/2022  -     Magnesium, RBC; Future; Expected date: 07/15/2022  -     Insulin, Random; Future; Expected date: 07/15/2022  -     CRP, High Sensitivity; Future; Expected date: 07/14/2022  -     Nuclear Stress - 3rd Party; Future    Class 1 obesity due to excess calories with serious comorbidity and body mass index (BMI) of 32.0 to 32.9 in adult  -     Nuclear Stress - 3rd Party; Future  -     Echo; Future    Preop cardiovascular exam  -     Nuclear Stress - 3rd Party; Future  -     Echo; Future    Tobacco dependence due to cigarettes    Lexiscan and complete echo for preop exam.  Continue labetalol and amlodipine as directed- HTN  Labs: BMP, FLP, ALT, Mg, HS-CRP, fasting insulin with phone review.   Monitor BP at home.  Recommended reducing/eliminating sodas due to sugar content. He has elevated triglycerides and low HDL.  Encouraged reduction in processed foods, white starches, and increase non-starchy vegetables. Discussed making small, progressive changes in lifestyle.  Sodium restriction encouraged.  Tobacco cessation counseling. He and is wife are working on it at home. He verbalized his understanding of CV consequences of tobacco use.  Continue Chantix.  He stated that he is taking it once a day asked him to check the bottle to make sure that he should not be taking it twice a day.  Add patches if needed.  He does not want to go back to tobacco cessation  program.  Encouraged exercise-even chair exercises and walking as much as possible.  Encouraged weight loss.  Follow up in 1 year or call sooner for any problems.    Shyann Alas NP  [facility]    This note has been prepared using a combination of Gecko dictation device and typing.  It has been checked for errors but some errors may still exist within the note as a result of speech recognition errors and/or typographical errors.

## 2022-07-14 ENCOUNTER — OFFICE VISIT (OUTPATIENT)
Dept: CARDIOLOGY | Facility: CLINIC | Age: 52
End: 2022-07-14
Payer: MEDICARE

## 2022-07-14 VITALS
DIASTOLIC BLOOD PRESSURE: 78 MMHG | SYSTOLIC BLOOD PRESSURE: 142 MMHG | HEIGHT: 71 IN | BODY MASS INDEX: 32.75 KG/M2 | WEIGHT: 233.94 LBS | HEART RATE: 82 BPM | OXYGEN SATURATION: 98 %

## 2022-07-14 DIAGNOSIS — E78.5 DYSLIPIDEMIA: ICD-10-CM

## 2022-07-14 DIAGNOSIS — I10 ESSENTIAL HYPERTENSION: Primary | ICD-10-CM

## 2022-07-14 DIAGNOSIS — Z01.810 PREOP CARDIOVASCULAR EXAM: ICD-10-CM

## 2022-07-14 DIAGNOSIS — I35.8 AORTIC VALVE SCLEROSIS: ICD-10-CM

## 2022-07-14 DIAGNOSIS — E66.09 CLASS 1 OBESITY DUE TO EXCESS CALORIES WITH SERIOUS COMORBIDITY AND BODY MASS INDEX (BMI) OF 32.0 TO 32.9 IN ADULT: ICD-10-CM

## 2022-07-14 DIAGNOSIS — F17.210 TOBACCO DEPENDENCE DUE TO CIGARETTES: ICD-10-CM

## 2022-07-14 PROCEDURE — 3008F BODY MASS INDEX DOCD: CPT | Mod: CPTII,S$GLB,, | Performed by: NURSE PRACTITIONER

## 2022-07-14 PROCEDURE — 3077F PR MOST RECENT SYSTOLIC BLOOD PRESSURE >= 140 MM HG: ICD-10-PCS | Mod: CPTII,S$GLB,, | Performed by: NURSE PRACTITIONER

## 2022-07-14 PROCEDURE — 3008F PR BODY MASS INDEX (BMI) DOCUMENTED: ICD-10-PCS | Mod: CPTII,S$GLB,, | Performed by: NURSE PRACTITIONER

## 2022-07-14 PROCEDURE — 99999 PR PBB SHADOW E&M-EST. PATIENT-LVL III: CPT | Mod: PBBFAC,,, | Performed by: NURSE PRACTITIONER

## 2022-07-14 PROCEDURE — 1160F RVW MEDS BY RX/DR IN RCRD: CPT | Mod: CPTII,S$GLB,, | Performed by: NURSE PRACTITIONER

## 2022-07-14 PROCEDURE — 3078F DIAST BP <80 MM HG: CPT | Mod: CPTII,S$GLB,, | Performed by: NURSE PRACTITIONER

## 2022-07-14 PROCEDURE — 3078F PR MOST RECENT DIASTOLIC BLOOD PRESSURE < 80 MM HG: ICD-10-PCS | Mod: CPTII,S$GLB,, | Performed by: NURSE PRACTITIONER

## 2022-07-14 PROCEDURE — 99213 OFFICE O/P EST LOW 20 MIN: CPT | Mod: PBBFAC,PO | Performed by: NURSE PRACTITIONER

## 2022-07-14 PROCEDURE — 1159F MED LIST DOCD IN RCRD: CPT | Mod: CPTII,S$GLB,, | Performed by: NURSE PRACTITIONER

## 2022-07-14 PROCEDURE — 1160F PR REVIEW ALL MEDS BY PRESCRIBER/CLIN PHARMACIST DOCUMENTED: ICD-10-PCS | Mod: CPTII,S$GLB,, | Performed by: NURSE PRACTITIONER

## 2022-07-14 PROCEDURE — 99214 PR OFFICE/OUTPT VISIT, EST, LEVL IV, 30-39 MIN: ICD-10-PCS | Mod: S$GLB,,, | Performed by: NURSE PRACTITIONER

## 2022-07-14 PROCEDURE — 99999 PR PBB SHADOW E&M-EST. PATIENT-LVL III: ICD-10-PCS | Mod: PBBFAC,,, | Performed by: NURSE PRACTITIONER

## 2022-07-14 PROCEDURE — 99214 OFFICE O/P EST MOD 30 MIN: CPT | Mod: S$GLB,,, | Performed by: NURSE PRACTITIONER

## 2022-07-14 PROCEDURE — 1159F PR MEDICATION LIST DOCUMENTED IN MEDICAL RECORD: ICD-10-PCS | Mod: CPTII,S$GLB,, | Performed by: NURSE PRACTITIONER

## 2022-07-14 PROCEDURE — 3077F SYST BP >= 140 MM HG: CPT | Mod: CPTII,S$GLB,, | Performed by: NURSE PRACTITIONER

## 2022-07-14 RX ORDER — VARENICLINE TARTRATE 1 MG/1
1 TABLET, FILM COATED ORAL DAILY
COMMUNITY

## 2022-07-14 RX ORDER — TESTOSTERONE CYPIONATE 200 MG/ML
INJECTION, SOLUTION INTRAMUSCULAR
COMMUNITY

## 2022-07-14 RX ORDER — FLUTICASONE PROPIONATE 50 MCG
2 SPRAY, SUSPENSION (ML) NASAL DAILY
COMMUNITY
Start: 2022-05-11

## 2022-07-15 ENCOUNTER — LAB VISIT (OUTPATIENT)
Dept: LAB | Facility: HOSPITAL | Age: 52
End: 2022-07-15
Attending: NURSE PRACTITIONER
Payer: MEDICARE

## 2022-07-15 DIAGNOSIS — I10 ESSENTIAL HYPERTENSION: ICD-10-CM

## 2022-07-15 DIAGNOSIS — E78.5 DYSLIPIDEMIA: ICD-10-CM

## 2022-07-15 LAB
ALBUMIN SERPL BCP-MCNC: 4 G/DL (ref 3.5–5.2)
ALP SERPL-CCNC: 106 U/L (ref 55–135)
ALT SERPL W/O P-5'-P-CCNC: 33 U/L (ref 10–44)
ANION GAP SERPL CALC-SCNC: 9 MMOL/L (ref 8–16)
AST SERPL-CCNC: 31 U/L (ref 10–40)
BILIRUB SERPL-MCNC: 0.6 MG/DL (ref 0.1–1)
BUN SERPL-MCNC: 6 MG/DL (ref 6–20)
CALCIUM SERPL-MCNC: 9.6 MG/DL (ref 8.7–10.5)
CHLORIDE SERPL-SCNC: 98 MMOL/L (ref 95–110)
CHOLEST SERPL-MCNC: 174 MG/DL (ref 120–199)
CHOLEST/HDLC SERPL: 6.2 {RATIO} (ref 2–5)
CO2 SERPL-SCNC: 30 MMOL/L (ref 23–29)
CREAT SERPL-MCNC: 0.7 MG/DL (ref 0.5–1.4)
CRP SERPL-MCNC: 13.53 MG/L (ref 0–3.19)
EST. GFR  (AFRICAN AMERICAN): >60 ML/MIN/1.73 M^2
EST. GFR  (NON AFRICAN AMERICAN): >60 ML/MIN/1.73 M^2
GLUCOSE SERPL-MCNC: 117 MG/DL (ref 70–110)
HDLC SERPL-MCNC: 28 MG/DL (ref 40–75)
HDLC SERPL: 16.1 % (ref 20–50)
INSULIN COLLECTION INTERVAL: 0
INSULIN SERPL-ACNC: 39.1 UU/ML
LDLC SERPL CALC-MCNC: 121.2 MG/DL (ref 63–159)
NONHDLC SERPL-MCNC: 146 MG/DL
POTASSIUM SERPL-SCNC: 4.1 MMOL/L (ref 3.5–5.1)
PROT SERPL-MCNC: 7.4 G/DL (ref 6–8.4)
SODIUM SERPL-SCNC: 137 MMOL/L (ref 136–145)
TRIGL SERPL-MCNC: 124 MG/DL (ref 30–150)

## 2022-07-15 PROCEDURE — 80053 COMPREHEN METABOLIC PANEL: CPT | Performed by: NURSE PRACTITIONER

## 2022-07-15 PROCEDURE — 80061 LIPID PANEL: CPT | Performed by: NURSE PRACTITIONER

## 2022-07-15 PROCEDURE — 86141 C-REACTIVE PROTEIN HS: CPT | Performed by: NURSE PRACTITIONER

## 2022-07-15 PROCEDURE — 83525 ASSAY OF INSULIN: CPT | Performed by: NURSE PRACTITIONER

## 2022-07-15 PROCEDURE — 36415 COLL VENOUS BLD VENIPUNCTURE: CPT | Mod: PO | Performed by: NURSE PRACTITIONER

## 2022-07-15 PROCEDURE — 83735 ASSAY OF MAGNESIUM: CPT | Performed by: NURSE PRACTITIONER

## 2022-07-20 LAB — MAGNESIUM RBC-MCNC: 4.9 MG/DL (ref 4–6.4)

## 2022-07-20 NOTE — PROGRESS NOTES
LDL and non-HDL are elevated. He also seems to be prediabetic with increased inflammation in his body. The Dr. Pepper is definitely contributing to this because it's too much sugar. His fasting insulin should be less than 10 and it is 39. He is well on the road to diabetes. But I believe that this can be turned around with changes in diet. Can schedule visit (virtual or in clinic) to discuss options and answer questions.

## 2022-07-21 ENCOUNTER — TELEPHONE (OUTPATIENT)
Dept: CARDIOLOGY | Facility: CLINIC | Age: 52
End: 2022-07-21
Payer: MEDICARE

## 2022-07-21 ENCOUNTER — HOSPITAL ENCOUNTER (OUTPATIENT)
Dept: CARDIOLOGY | Facility: HOSPITAL | Age: 52
Discharge: HOME | End: 2022-07-21
Attending: NURSE PRACTITIONER
Payer: MEDICARE

## 2022-07-21 VITALS
BODY MASS INDEX: 32.62 KG/M2 | SYSTOLIC BLOOD PRESSURE: 142 MMHG | DIASTOLIC BLOOD PRESSURE: 78 MMHG | WEIGHT: 233 LBS | HEART RATE: 76 BPM | HEIGHT: 71 IN

## 2022-07-21 DIAGNOSIS — I10 ESSENTIAL HYPERTENSION: ICD-10-CM

## 2022-07-21 DIAGNOSIS — E66.09 CLASS 1 OBESITY DUE TO EXCESS CALORIES WITH SERIOUS COMORBIDITY AND BODY MASS INDEX (BMI) OF 32.0 TO 32.9 IN ADULT: ICD-10-CM

## 2022-07-21 DIAGNOSIS — I35.8 AORTIC VALVE SCLEROSIS: ICD-10-CM

## 2022-07-21 DIAGNOSIS — Z01.810 PREOP CARDIOVASCULAR EXAM: ICD-10-CM

## 2022-07-21 LAB
AORTIC ROOT ANNULUS: 3.23 CM
ASCENDING AORTA: 2.32 CM
AV INDEX (PROSTH): 0.55
AV MEAN GRADIENT: 14 MMHG
AV PEAK GRADIENT: 27 MMHG
AV VALVE AREA: 2.14 CM2
AV VELOCITY RATIO: 0.5
BSA FOR ECHO PROCEDURE: 2.3 M2
CV ECHO LV RWT: 0.45 CM
DOP CALC AO PEAK VEL: 2.61 M/S
DOP CALC AO VTI: 58.5 CM
DOP CALC LVOT AREA: 3.9 CM2
DOP CALC LVOT DIAMETER: 2.23 CM
DOP CALC LVOT PEAK VEL: 1.31 M/S
DOP CALC LVOT STROKE VOLUME: 125.31 CM3
DOP CALC RVOT PEAK VEL: 1.1 M/S
DOP CALC RVOT VTI: 24.9 CM
DOP CALCLVOT PEAK VEL VTI: 32.1 CM
E WAVE DECELERATION TIME: 223.11 MSEC
E/A RATIO: 0.87
E/E' RATIO: 8.09 M/S
ECHO LV POSTERIOR WALL: 1.13 CM (ref 0.6–1.1)
EJECTION FRACTION: 55 %
FRACTIONAL SHORTENING: 34 % (ref 28–44)
INTERVENTRICULAR SEPTUM: 1.13 CM (ref 0.6–1.1)
IVRT: 108.47 MSEC
LA MAJOR: 6.42 CM
LA MINOR: 6.21 CM
LA WIDTH: 3.9 CM
LEFT ATRIUM SIZE: 4.33 CM
LEFT ATRIUM VOLUME INDEX MOD: 32.7 ML/M2
LEFT ATRIUM VOLUME INDEX: 40.3 ML/M2
LEFT ATRIUM VOLUME MOD: 73.68 CM3
LEFT ATRIUM VOLUME: 90.62 CM3
LEFT INTERNAL DIMENSION IN SYSTOLE: 3.34 CM (ref 2.1–4)
LEFT VENTRICLE DIASTOLIC VOLUME INDEX: 53.81 ML/M2
LEFT VENTRICLE DIASTOLIC VOLUME: 121.08 ML
LEFT VENTRICLE MASS INDEX: 97 G/M2
LEFT VENTRICLE SYSTOLIC VOLUME INDEX: 20.2 ML/M2
LEFT VENTRICLE SYSTOLIC VOLUME: 45.37 ML
LEFT VENTRICULAR INTERNAL DIMENSION IN DIASTOLE: 5.05 CM (ref 3.5–6)
LEFT VENTRICULAR MASS: 218.44 G
LV LATERAL E/E' RATIO: 6.2 M/S
LV SEPTAL E/E' RATIO: 11.63 M/S
LVOT MG: 3.93 MMHG
LVOT MV: 0.94 CM/S
MV PEAK A VEL: 1.07 M/S
MV PEAK E VEL: 0.93 M/S
MV STENOSIS PRESSURE HALF TIME: 64.7 MS
MV VALVE AREA P 1/2 METHOD: 3.4 CM2
PISA TR MAX VEL: 2.7 M/S
PULM VEIN S/D RATIO: 1.22
PV MEAN GRADIENT: 3.12 MMHG
PV PEAK D VEL: 0.42 M/S
PV PEAK S VEL: 0.51 M/S
PV PEAK VELOCITY: 1.38 CM/S
RA MAJOR: 6.43 CM
RA PRESSURE: 3 MMHG
RA WIDTH: 3.4 CM
RIGHT VENTRICULAR END-DIASTOLIC DIMENSION: 2.51 CM
SINUS: 2.82 CM
STJ: 2.69 CM
TDI LATERAL: 0.15 M/S
TDI SEPTAL: 0.08 M/S
TDI: 0.12 M/S
TR MAX PG: 29 MMHG
TRICUSPID ANNULAR PLANE SYSTOLIC EXCURSION: 2.77 CM
TV REST PULMONARY ARTERY PRESSURE: 32 MMHG

## 2022-07-21 PROCEDURE — 93306 TTE W/DOPPLER COMPLETE: CPT | Mod: 26,,, | Performed by: INTERNAL MEDICINE

## 2022-07-21 PROCEDURE — 93306 ECHO (CUPID ONLY): ICD-10-PCS | Mod: 26,,, | Performed by: INTERNAL MEDICINE

## 2022-07-21 PROCEDURE — 93306 TTE W/DOPPLER COMPLETE: CPT | Mod: PO

## 2022-07-21 NOTE — TELEPHONE ENCOUNTER
----- Message from Shyann Alas NP sent at 7/21/2022  8:21 AM CDT -----  Thanks!  Shyann  ----- Message -----  From: Tanesha Heck MA  Sent: 7/20/2022   3:25 PM CDT  To: Shyann Alas NP    Oh no.. well I'll have kalyan tell me when he is here so I can discuss it with him.   ----- Message -----  From: Shyann Alas NP  Sent: 7/20/2022   1:14 PM CDT  To: Tanesha Heck MA    He actually doesn't have an appointment with me! It's for an echo.  Shyann  ----- Message -----  From: Tanesha Heck MA  Sent: 7/20/2022  12:09 PM CDT  To: Shyann Alas NP    Patient cut me off before I could read him this results. Stated that he is seeing you tomorrow and would prefer to discuss in person.   ----- Message -----  From: Shyann Alas NP  Sent: 7/20/2022  11:19 AM CDT  To: Bishop Arita Staff    LDL and non-HDL are elevated. He also seems to be prediabetic with increased inflammation in his body. The Dr. Pepper is definitely contributing to this because it's too much sugar. His fasting insulin should be less than 10 and it is 39. He is well on the road to diabetes. But I believe that this can be turned around with changes in diet. Can schedule visit (virtual or in clinic) to discuss options and answer questions.

## 2022-07-21 NOTE — TELEPHONE ENCOUNTER
Followed up with Carson in clinic. The patient has been notified of this information and all questions answered.  LDL and non-HDL are elevated. He also seems to be prediabetic with increased inflammation in his body. The Dr. Pepper is definitely contributing to this because it's too much sugar. His fasting insulin should be less than 10 and it is 39. He is well on the road to diabetes. But I believe that this can be turned around with changes in diet. Can schedule visit (virtual or in clinic) to discuss options and answer questions.. Patient verbalized understanding.

## 2022-08-05 ENCOUNTER — TELEPHONE (OUTPATIENT)
Dept: CARDIOLOGY | Facility: HOSPITAL | Age: 52
End: 2022-08-05
Payer: MEDICARE

## 2022-08-08 ENCOUNTER — NURSE TRIAGE (OUTPATIENT)
Dept: ADMINISTRATIVE | Facility: CLINIC | Age: 52
End: 2022-08-08
Payer: MEDICARE

## 2022-08-08 NOTE — TELEPHONE ENCOUNTER
"Patient wife calling on behalf of patient. States patient "doesn't feel good". Wife states his HR is 159. Bp 116/78. Patient denies chest pain. Reports difficulty breathing due to mucus in his chest. Denies dizziness, lightheaded or weakness.Advised patient of dispo to go to ED now. Patient refused. Reirriated the importance. Patient states " I'll see my doctor Wednesday when I go."  Advised the wife again he should go to ED. Verbalized understanding. Advised to call back if symptoms become worse.         Reason for Disposition   Difficulty breathing   Heart beating very rapidly (e.g., > 140 / minute) and present now (Exception: during exercise)    Additional Information   Negative: Passed out (i.e., lost consciousness, collapsed and was not responding)   Negative: Shock suspected (e.g., cold/pale/clammy skin, too weak to stand, low BP, rapid pulse)   Negative: Difficult to awaken or acting confused (e.g., disoriented, slurred speech)   Negative: Visible sweat on face or sweat dripping down face   Negative: Unable to walk, or can only walk with assistance (e.g., requires support)   Negative: Received SHOCK from implantable cardiac defibrillator and has persisting symptoms (i.e., palpitations, lightheadedness)   Negative: Dizziness, lightheadedness, or weakness and heart beating very rapidly (e.g., > 140 / minute)   Negative: Dizziness, lightheadedness, or weakness and heart beating very slowly (e.g., < 50 / minute)   Negative: Sounds like a life-threatening emergency to the triager   Negative: Dizziness, lightheadedness, or weakness    Protocols used: HEART RATE AND HEARTBEAT [id number]      "

## 2022-08-09 ENCOUNTER — TELEPHONE (OUTPATIENT)
Dept: CARDIOLOGY | Facility: HOSPITAL | Age: 52
End: 2022-08-09
Payer: MEDICARE

## 2022-08-09 NOTE — TELEPHONE ENCOUNTER
The patient per wife is at [facility] getting evaluated for tachycardia and HTN. He went this morning.Stress test on hold.

## 2022-08-09 NOTE — TELEPHONE ENCOUNTER
----- Message from Ingris Huggins LPN sent at 8/4/2022  9:05 AM CDT -----  LINDSAY-- This is one of nuclear pts scheduled for 8/8. When I called to let him know we would have to cancel, he informed me that he is having back surgery on 8/23. He is unsure if he will need stress test results for clearance, but sees Shyann on 8/12.

## 2022-08-11 ENCOUNTER — TELEPHONE (OUTPATIENT)
Dept: CARDIOLOGY | Facility: CLINIC | Age: 52
End: 2022-08-11
Payer: MEDICARE

## 2022-08-11 NOTE — TELEPHONE ENCOUNTER
Spoke with pt's wife regarding sleep medicine/pulmonary recommendation. Pt's wife asked that Dr. Antunez place a referral for pt to have sleep study done, and stated that it was very important that pt has this done. Stated that  at [facility] informed her that they could not set this up for them. Message sent to Dr. Antunez to place referral. Pt's wife will call back with any further questions or concerns.    ----- Message from Carroll Antunez MD sent at 8/10/2022  5:10 PM CDT -----  Contact: wife/Imelda [phone]  Yes appt with sleep medicine/pulmonary  ----- Message -----  From: Ingris Huggins LPN  Sent: 8/10/2022   1:35 PM CDT  To: Carroll Antunez MD    Please advise.  ----- Message -----  From: Aliyah López  Sent: 8/10/2022   1:20 PM CDT  To: Tulio RAYMOND Staff    Pt's wife calling to let you know pt was admitted to the hospital for tachycardia and severe sleep apnea. Wife would like to know if you could set him up with a sleep study b/c he has not had one in years or would he need to see Pulmonology.

## 2022-08-12 ENCOUNTER — TELEPHONE (OUTPATIENT)
Dept: CARDIOLOGY | Facility: CLINIC | Age: 52
End: 2022-08-12
Payer: MEDICARE

## 2022-08-12 DIAGNOSIS — G47.30 SLEEP APNEA, UNSPECIFIED TYPE: Primary | ICD-10-CM

## 2022-08-12 NOTE — PROGRESS NOTES
Subjective:    Patient ID:  Carson Rivers is a 52 y.o. male who presents for evaluation of Follow-up      HPI: Mr. Carson Rivers presents to the clinic for follow up of test results  and hospital discharge follow up. He was admitted to [facility] with atrial flutter with RVR, diastolic heart failure, and severe sleep apnea. He presented to ER with palpitations and chest tightness for about 3 days. He was found to be in SVT at 160 BPM.  He was started on diltiazem in ER, but it did not control his heart rate. He was given metoprolol 25mg and digoxin 0.25mg without successful control of heart rate.  Adenosine was also tried without success; however, his heart rate slowed down enough that they could see flutter waves.  He refused ablation. The patient stated that he was successfully cardioverted to NSR at some point. He also had IV lasix for diuresis (proBNP 1512). Troponin negative.   They noticed MATTY at hospital and Mr. Rivers is scheduled to see pulmonary for evaluation on August 29, 2022. He is taking Eliquis as directed without any problems. He denied any unusual bleeding. He stated that he is feeling much better since hospital discharge-except for low back pain which is chronic. He is really wanting to get surgery to get some relief of pain.  He denied any palpitations, lightheadedness, dizziness, or near syncope.  He is taking sotalol without problems and labetalol was stopped in hospital.  He is still smoking about 8-10 cigarettes/day; taking chantix. He does not want to participate in tobacco cessation program.    He is wearing a Zio patch placed by Dr. Gavin at [facility].    CRF- smoking, family hx of CAD, HTN, HLP, obesity    Medications: he is not missing any doses. He is taking sotalol, amlodipine, eliquis.  Sodium: he does not add salt to foods,  His wife is reading labels for sodium content.   Dietary Fats: fried fish and shrimp; prefers starchy vegetables; very  seldom eats out.  Dietary Sugars: Dr. Pepper- reduced to 1 liter/day.  Exercise: he is limited by back pain. He tries to stay active.  He stated that he had to quit his job at [employer] because he could not stand for 4 hours.  Tobacco: currently smoking 8-10 cigarettes/day. He is taking chantix.  Alcohol: no alcohol use     Weight: 103.9 kg (229 lb 1.6 oz) he states that his daily weights has been stable Body mass index is 31.95 kg/m².  Wt Readings from Last 3 Encounters:   08/19/22 103.9 kg (229 lb 1.6 oz)   07/21/22 105.7 kg (233 lb)   07/14/22 106.1 kg (233 lb 14.5 oz)     BP log: none; stated that it has been 130-150/70s at home.    Review of Systems   Constitutional: Negative for chills, decreased appetite, fever, night sweats, weight gain and weight loss.   HENT: Positive for hoarse voice (With mucus in throat. Stated that Xanax increases mucous in the throat.). Negative for congestion.    Cardiovascular: Negative for chest pain, claudication, cyanosis, dyspnea on exertion, irregular heartbeat, leg swelling, near-syncope, orthopnea, palpitations, paroxysmal nocturnal dyspnea and syncope.   Respiratory: Negative for cough, hemoptysis, shortness of breath, sputum production and wheezing.    Hematologic/Lymphatic: Negative for adenopathy and bleeding problem. Does not bruise/bleed easily.   Skin: Negative for color change and nail changes.   Musculoskeletal: Positive for arthritis (multiple joints and his back.) and back pain (Chronic; anticipating back surgery soon.).   Gastrointestinal: Negative for bloating, abdominal pain, change in bowel habit, heartburn, hematochezia, melena, nausea and vomiting.   Genitourinary: Negative for hematuria.   Neurological: Negative for dizziness and light-headedness.   Psychiatric/Behavioral: Negative for altered mental status.       Objective:   Physical Exam  Constitutional:       General: He is not in acute distress.     Appearance: He is well-developed. He is not diaphoretic.    HENT:      Head: Normocephalic and atraumatic.   Eyes:      General: No scleral icterus.     Conjunctiva/sclera: Conjunctivae normal.   Neck:      Thyroid: No thyromegaly.      Vascular: No JVD.      Trachea: No tracheal deviation.   Cardiovascular:      Rate and Rhythm: Normal rate and regular rhythm.      Pulses: Intact distal pulses.      Heart sounds: No murmurs heard    No friction rub. No gallop.   No cyanosis, clubbing, or edema noted.  Pulmonary:      Effort: Pulmonary effort is normal. No respiratory distress.      Breath sounds: No stridor. No rhonchi or rales.      Comments: Lungs clear to auscultation bilaterally  Chest:      Chest wall: No tenderness.   Abdominal:      General: Bowel sounds are normal. There is no distension.      Palpations: Abdomen is soft. There is no mass.      Tenderness: There is no abdominal tenderness. There is no guarding or rebound.   Musculoskeletal:         General: Normal range of motion.      Cervical back: Neck supple.   Lymphadenopathy:      Cervical: No cervical adenopathy.   Skin:     General: Skin is warm and dry.      Coloration: Skin is not pale.      Findings: No erythema or rash.      Comments: Carmine   Neurological:      Mental Status: He is alert and oriented to person, place, and time.        Latest Reference Range & Units 07/15/22 08:57   Sodium 136 - 145 mmol/L 137   Potassium 3.5 - 5.1 mmol/L 4.1   Chloride 95 - 110 mmol/L 98   CO2 23 - 29 mmol/L 30 (H)   Anion Gap 8 - 16 mmol/L 9   BUN 6 - 20 mg/dL 6   Creatinine 0.5 - 1.4 mg/dL 0.7   eGFR if non African American >60 mL/min/1.73 m^2 >60.0   eGFR if African American >60 mL/min/1.73 m^2 >60.0   Glucose 70 - 110 mg/dL 117 (H)   Calcium 8.7 - 10.5 mg/dL 9.6   Alkaline Phosphatase 55 - 135 U/L 106   PROTEIN TOTAL 6.0 - 8.4 g/dL 7.4   Albumin 3.5 - 5.2 g/dL 4.0   BILIRUBIN TOTAL 0.1 - 1.0 mg/dL 0.6   AST 10 - 40 U/L 31   ALT 10 - 44 U/L 33   Cholesterol 120 - 199 mg/dL 174   HDL 40 - 75 mg/dL 28 (L)    HDL/Cholesterol Ratio 20.0 - 50.0 % 16.1 (L)   LDL Cholesterol External 63.0 - 159.0 mg/dL 121.2   Non-HDL Cholesterol mg/dL 146   Total Cholesterol/HDL Ratio 2.0 - 5.0  6.2 (H)   Triglycerides 30 - 150 mg/dL 124   CRP, High Sensitivity 0.00 - 3.19 mg/L 13.53 (H)   Magnesium RBC 4.0 - 6.4 mg/dL 4.9   Insulin <25.0 uU/mL 39.1 (H)   Insulin Collection Interval  0000   (H): Data is abnormally high  (L): Data is abnormally low    IRMA IR 11.3  Trig/HDL 4.42 (very low HDL)  My result note: LDL and non-HDL are elevated. He also seems to be prediabetic with increased inflammation in his body. The Dr. Pepper is definitely contributing to this because it's too much sugar. His fasting insulin should be less than 10 and it is 39. He is well on the road to diabetes. But I believe that this can be turned around with changes in diet. Can schedule visit (virtual or in clinic) to discuss options and answer questions.       Pharm NM Stress test 4/17/19: Impression: NORMAL MYOCARDIAL PERFUSION  1. The perfusion scan is free of evidence for myocardial ischemia or injury.   2. Resting wall motion is physiologic.   3. Resting LV function is normal.   4. The ventricular volumes are normal at rest and stress.   5. The extracardiac distribution of radioactivity is normal.   6. There was no previous study available to compare.    Echo 7/21/22: Summary  · The left ventricle is normal in size with concentric remodeling and normal systolic function.  · Mild left atrial enlargement.  · The estimated ejection fraction is 55%.  · Normal left ventricular diastolic function.  · Normal right ventricular size with normal right ventricular systolic function.  · Normal central venous pressure (3 mmHg).  · The estimated PA systolic pressure is 32 mmHg.    Echo 4/2/19: CONCLUSIONS     1 - Normal left ventricular systolic function (EF 60-65%).     2 - Normal left ventricular diastolic function.     3 - Normal right ventricular systolic function .     4  - Concentric hypertrophy.     Assessment:      1. Hospital discharge follow-up    2. Encounter to discuss test results    3. MATTY (obstructive sleep apnea)    4. Essential hypertension    5. Aortic valve sclerosis    6. Dyslipidemia    7. Class 1 obesity due to excess calories with serious comorbidity and body mass index (BMI) of 32.0 to 32.9 in adult    8. Tobacco dependence due to cigarettes    9. Family history of coronary artery disease    10. Atrial flutter, unspecified type        Plan:     Hospital discharge follow-up  -     IN OFFICE EKG 12-LEAD (to Muse)    Encounter to discuss test results    MATTY (obstructive sleep apnea)    Essential hypertension    Aortic valve sclerosis    Dyslipidemia    Class 1 obesity due to excess calories with serious comorbidity and body mass index (BMI) of 32.0 to 32.9 in adult    Tobacco dependence due to cigarettes    Family history of coronary artery disease    Atrial flutter, unspecified type  -     IN OFFICE EKG 12-LEAD (to Muse)      EKG today: NSR @ 66 BPM; LAE; RSR' and QR pattern in V1 suggest RV conduction delay-QRS 108ms.  Lexiscan for preop exam.  Continue (sotalol) and amlodipine as directed- HTN  Continue sotalol and Eliquis - atrial flutter.   Monitor BP at home. BP goal less than 130/80. Will call for problems.  Reviewed previous labs with him and answered all of his questions. He has insulin resistance and hyperglycemia.  Recommended eliminating sodas due to sugar content. He has elevated triglycerides and low HDL.  Encouraged reduction in processed foods, white starches, and increase non-starchy vegetables. Discussed making small, progressive changes in lifestyle.  Sodium restriction encouraged.  Tobacco cessation counseling. He and is wife are working on it at home. He verbalized his understanding of CV consequences of tobacco use.  Continue Chantix.  He does not want to go back to tobacco cessation program (he did program at [facility] previously).  Encouraged  exercise-even chair exercises and walking as much as possible.  Encouraged weight loss.  Get results of Zio patch from Dr. Gavin in [place] prior to next appointment.  Follow up with Dr. Antunez in 1 month or call sooner for any problems.  Shyann Alas NP  [facility]    This note has been prepared using a combination of Bird[employer] dictation device and typing.  It has been checked for errors but some errors may still exist within the note as a result of speech recognition errors and/or typographical errors.

## 2022-08-19 ENCOUNTER — OFFICE VISIT (OUTPATIENT)
Dept: CARDIOLOGY | Facility: CLINIC | Age: 52
End: 2022-08-19
Payer: MEDICARE

## 2022-08-19 ENCOUNTER — HOSPITAL ENCOUNTER (OUTPATIENT)
Dept: CARDIOLOGY | Facility: HOSPITAL | Age: 52
Discharge: HOME | End: 2022-08-19
Payer: MEDICARE

## 2022-08-19 VITALS
SYSTOLIC BLOOD PRESSURE: 144 MMHG | OXYGEN SATURATION: 97 % | DIASTOLIC BLOOD PRESSURE: 78 MMHG | BODY MASS INDEX: 32.08 KG/M2 | HEIGHT: 71 IN | WEIGHT: 229.13 LBS | HEART RATE: 78 BPM

## 2022-08-19 DIAGNOSIS — Z82.49 FAMILY HISTORY OF CORONARY ARTERY DISEASE: ICD-10-CM

## 2022-08-19 DIAGNOSIS — G47.33 OSA (OBSTRUCTIVE SLEEP APNEA): ICD-10-CM

## 2022-08-19 DIAGNOSIS — E66.09 CLASS 1 OBESITY DUE TO EXCESS CALORIES WITH SERIOUS COMORBIDITY AND BODY MASS INDEX (BMI) OF 32.0 TO 32.9 IN ADULT: ICD-10-CM

## 2022-08-19 DIAGNOSIS — F17.210 TOBACCO DEPENDENCE DUE TO CIGARETTES: ICD-10-CM

## 2022-08-19 DIAGNOSIS — Z09 HOSPITAL DISCHARGE FOLLOW-UP: Primary | ICD-10-CM

## 2022-08-19 DIAGNOSIS — Z71.2 ENCOUNTER TO DISCUSS TEST RESULTS: ICD-10-CM

## 2022-08-19 DIAGNOSIS — E78.5 DYSLIPIDEMIA: ICD-10-CM

## 2022-08-19 DIAGNOSIS — I10 ESSENTIAL HYPERTENSION: ICD-10-CM

## 2022-08-19 DIAGNOSIS — I48.92 ATRIAL FLUTTER, UNSPECIFIED TYPE: ICD-10-CM

## 2022-08-19 DIAGNOSIS — I35.8 AORTIC VALVE SCLEROSIS: ICD-10-CM

## 2022-08-19 PROCEDURE — 99214 OFFICE O/P EST MOD 30 MIN: CPT | Mod: S$GLB,,, | Performed by: NURSE PRACTITIONER

## 2022-08-19 PROCEDURE — 3078F PR MOST RECENT DIASTOLIC BLOOD PRESSURE < 80 MM HG: ICD-10-PCS | Mod: CPTII,S$GLB,, | Performed by: NURSE PRACTITIONER

## 2022-08-19 PROCEDURE — 1159F PR MEDICATION LIST DOCUMENTED IN MEDICAL RECORD: ICD-10-PCS | Mod: CPTII,S$GLB,, | Performed by: NURSE PRACTITIONER

## 2022-08-19 PROCEDURE — 93010 ELECTROCARDIOGRAM REPORT: CPT | Mod: S$GLB,,, | Performed by: INTERNAL MEDICINE

## 2022-08-19 PROCEDURE — 1160F PR REVIEW ALL MEDS BY PRESCRIBER/CLIN PHARMACIST DOCUMENTED: ICD-10-PCS | Mod: CPTII,S$GLB,, | Performed by: NURSE PRACTITIONER

## 2022-08-19 PROCEDURE — 99214 PR OFFICE/OUTPT VISIT, EST, LEVL IV, 30-39 MIN: ICD-10-PCS | Mod: S$GLB,,, | Performed by: NURSE PRACTITIONER

## 2022-08-19 PROCEDURE — 3077F SYST BP >= 140 MM HG: CPT | Mod: CPTII,S$GLB,, | Performed by: NURSE PRACTITIONER

## 2022-08-19 PROCEDURE — 1160F RVW MEDS BY RX/DR IN RCRD: CPT | Mod: CPTII,S$GLB,, | Performed by: NURSE PRACTITIONER

## 2022-08-19 PROCEDURE — 3078F DIAST BP <80 MM HG: CPT | Mod: CPTII,S$GLB,, | Performed by: NURSE PRACTITIONER

## 2022-08-19 PROCEDURE — 99999 PR PBB SHADOW E&M-EST. PATIENT-LVL IV: CPT | Mod: PBBFAC,,, | Performed by: NURSE PRACTITIONER

## 2022-08-19 PROCEDURE — 99999 PR PBB SHADOW E&M-EST. PATIENT-LVL IV: ICD-10-PCS | Mod: PBBFAC,,, | Performed by: NURSE PRACTITIONER

## 2022-08-19 PROCEDURE — 3077F PR MOST RECENT SYSTOLIC BLOOD PRESSURE >= 140 MM HG: ICD-10-PCS | Mod: CPTII,S$GLB,, | Performed by: NURSE PRACTITIONER

## 2022-08-19 PROCEDURE — 3008F BODY MASS INDEX DOCD: CPT | Mod: CPTII,S$GLB,, | Performed by: NURSE PRACTITIONER

## 2022-08-19 PROCEDURE — 93010 EKG 12-LEAD: ICD-10-PCS | Mod: S$GLB,,, | Performed by: INTERNAL MEDICINE

## 2022-08-19 PROCEDURE — 93005 ELECTROCARDIOGRAM TRACING: CPT | Mod: PO

## 2022-08-19 PROCEDURE — 1159F MED LIST DOCD IN RCRD: CPT | Mod: CPTII,S$GLB,, | Performed by: NURSE PRACTITIONER

## 2022-08-19 PROCEDURE — 3008F PR BODY MASS INDEX (BMI) DOCUMENTED: ICD-10-PCS | Mod: CPTII,S$GLB,, | Performed by: NURSE PRACTITIONER

## 2022-08-19 RX ORDER — SOTALOL HYDROCHLORIDE 120 MG/1
80 TABLET ORAL EVERY 12 HOURS
COMMUNITY
End: 2022-09-09 | Stop reason: SDUPTHER

## 2022-08-22 ENCOUNTER — HOSPITAL ENCOUNTER (OUTPATIENT)
Dept: CARDIOLOGY | Facility: HOSPITAL | Age: 52
Discharge: HOME | End: 2022-08-22
Attending: NURSE PRACTITIONER
Payer: MEDICARE

## 2022-08-22 VITALS
DIASTOLIC BLOOD PRESSURE: 86 MMHG | WEIGHT: 229 LBS | SYSTOLIC BLOOD PRESSURE: 153 MMHG | HEART RATE: 71 BPM | HEIGHT: 71 IN | BODY MASS INDEX: 32.06 KG/M2

## 2022-08-22 DIAGNOSIS — E78.5 DYSLIPIDEMIA: ICD-10-CM

## 2022-08-22 DIAGNOSIS — E66.09 CLASS 1 OBESITY DUE TO EXCESS CALORIES WITH SERIOUS COMORBIDITY AND BODY MASS INDEX (BMI) OF 32.0 TO 32.9 IN ADULT: ICD-10-CM

## 2022-08-22 DIAGNOSIS — Z01.810 PREOP CARDIOVASCULAR EXAM: ICD-10-CM

## 2022-08-22 DIAGNOSIS — I10 ESSENTIAL HYPERTENSION: ICD-10-CM

## 2022-08-22 PROCEDURE — A9500 TC99M SESTAMIBI: HCPCS | Mod: PO

## 2022-08-22 PROCEDURE — 93016 CV STRESS TEST SUPVJ ONLY: CPT | Mod: ,,, | Performed by: INTERNAL MEDICINE

## 2022-08-22 PROCEDURE — 93018 CV STRESS TEST I&R ONLY: CPT | Mod: ,,, | Performed by: INTERNAL MEDICINE

## 2022-08-22 PROCEDURE — 93016 NUCLEAR STRESS - 3RD PARTY (CUPID ONLY): ICD-10-PCS | Mod: ,,, | Performed by: INTERNAL MEDICINE

## 2022-08-22 PROCEDURE — 63600175 PHARM REV CODE 636 W HCPCS: Mod: PO | Performed by: NURSE PRACTITIONER

## 2022-08-22 PROCEDURE — 93018 NUCLEAR STRESS - 3RD PARTY (CUPID ONLY): ICD-10-PCS | Mod: ,,, | Performed by: INTERNAL MEDICINE

## 2022-08-22 PROCEDURE — 78452 HT MUSCLE IMAGE SPECT MULT: CPT | Mod: 26,,, | Performed by: INTERNAL MEDICINE

## 2022-08-22 PROCEDURE — 78452 NUCLEAR STRESS - 3RD PARTY (CUPID ONLY): ICD-10-PCS | Mod: 26,,, | Performed by: INTERNAL MEDICINE

## 2022-08-22 RX ORDER — REGADENOSON 0.08 MG/ML
0.4 INJECTION, SOLUTION INTRAVENOUS ONCE
Status: COMPLETED | OUTPATIENT
Start: 2022-08-22 | End: 2022-08-22

## 2022-08-22 RX ADMIN — REGADENOSON 0.4 MG: 0.08 INJECTION, SOLUTION INTRAVENOUS at 10:08

## 2022-08-23 LAB
CV PHARM DOSE: 0.4 MG
CV STRESS BASE HR: 71 BPM
DIASTOLIC BLOOD PRESSURE: 86 MMHG
NUC REST EJECTION FRACTION: 61
NUC STRESS EJECTION FRACTION: 61 %
OHS CV CPX 1 MINUTE RECOVERY HEART RATE: 78 BPM
OHS CV CPX 85 PERCENT MAX PREDICTED HEART RATE MALE: 142
OHS CV CPX ESTIMATED METS: 1
OHS CV CPX MAX PREDICTED HEART RATE: 167
OHS CV CPX PATIENT IS FEMALE: 0
OHS CV CPX PATIENT IS MALE: 1
OHS CV CPX PEAK DIASTOLIC BLOOD PRESSURE: 78 MMHG
OHS CV CPX PEAK HEAR RATE: 79 BPM
OHS CV CPX PEAK RATE PRESSURE PRODUCT: NORMAL
OHS CV CPX PEAK SYSTOLIC BLOOD PRESSURE: 131 MMHG
OHS CV CPX PERCENT MAX PREDICTED HEART RATE ACHIEVED: 47
OHS CV CPX RATE PRESSURE PRODUCT PRESENTING: NORMAL
STRESS ECHO POST EXERCISE DUR MIN: 1 MINUTES
STRESS ECHO POST EXERCISE DUR SEC: 30 SECONDS
SYSTOLIC BLOOD PRESSURE: 153 MMHG

## 2022-08-24 ENCOUNTER — TELEPHONE (OUTPATIENT)
Dept: CARDIOLOGY | Facility: CLINIC | Age: 52
End: 2022-08-24
Payer: MEDICARE

## 2022-08-24 NOTE — TELEPHONE ENCOUNTER
----- Message from Ingris Huggins LPN sent at 8/24/2022  3:32 PM CDT -----    ----- Message -----  From: Shyann Alas NP  Sent: 8/24/2022   2:48 PM CDT  To: Ingris Huggins LPN    Stress test is normal.

## 2022-08-24 NOTE — TELEPHONE ENCOUNTER
The patient has been notified of this information and all questions answered.  Stress test is normal.. Patient verbalized understanding.

## 2022-08-29 PROBLEM — J42 CHRONIC BRONCHITIS: Status: ACTIVE | Noted: 2022-08-29

## 2022-08-29 PROBLEM — S06.9XAA TRAUMATIC BRAIN INJURY: Status: ACTIVE | Noted: 2022-08-29

## 2022-09-06 RX ORDER — SOTALOL HYDROCHLORIDE 120 MG/1
120 TABLET ORAL EVERY 12 HOURS
Qty: 90 TABLET | Refills: 3 | Status: CANCELLED | OUTPATIENT
Start: 2022-09-06

## 2022-09-09 DIAGNOSIS — I48.92 ATRIAL FLUTTER, UNSPECIFIED TYPE: Primary | ICD-10-CM

## 2022-09-09 RX ORDER — SOTALOL HYDROCHLORIDE 80 MG/1
80 TABLET ORAL EVERY 12 HOURS
Qty: 60 TABLET | Refills: 11 | Status: SHIPPED | OUTPATIENT
Start: 2022-09-09

## 2022-09-14 ENCOUNTER — OFFICE VISIT (OUTPATIENT)
Dept: CARDIOLOGY | Facility: CLINIC | Age: 52
End: 2022-09-14
Payer: MEDICARE

## 2022-09-14 VITALS
HEIGHT: 71 IN | OXYGEN SATURATION: 98 % | BODY MASS INDEX: 32.72 KG/M2 | HEART RATE: 78 BPM | SYSTOLIC BLOOD PRESSURE: 150 MMHG | DIASTOLIC BLOOD PRESSURE: 84 MMHG | WEIGHT: 233.69 LBS

## 2022-09-14 DIAGNOSIS — Z82.49 FAMILY HISTORY OF CORONARY ARTERY DISEASE: ICD-10-CM

## 2022-09-14 DIAGNOSIS — E78.5 HYPERLIPEMIA: ICD-10-CM

## 2022-09-14 DIAGNOSIS — Z01.810 PREOP CARDIOVASCULAR EXAM: ICD-10-CM

## 2022-09-14 DIAGNOSIS — F17.200 SMOKER: ICD-10-CM

## 2022-09-14 DIAGNOSIS — I35.8 AORTIC VALVE SCLEROSIS: Chronic | ICD-10-CM

## 2022-09-14 DIAGNOSIS — I48.3 TYPICAL ATRIAL FLUTTER: ICD-10-CM

## 2022-09-14 DIAGNOSIS — I10 ESSENTIAL HYPERTENSION: Primary | ICD-10-CM

## 2022-09-14 PROCEDURE — 1160F RVW MEDS BY RX/DR IN RCRD: CPT | Mod: CPTII,S$GLB,, | Performed by: INTERNAL MEDICINE

## 2022-09-14 PROCEDURE — 3077F SYST BP >= 140 MM HG: CPT | Mod: CPTII,S$GLB,, | Performed by: INTERNAL MEDICINE

## 2022-09-14 PROCEDURE — 99215 OFFICE O/P EST HI 40 MIN: CPT | Mod: S$GLB,,, | Performed by: INTERNAL MEDICINE

## 2022-09-14 PROCEDURE — 99215 PR OFFICE/OUTPT VISIT, EST, LEVL V, 40-54 MIN: ICD-10-PCS | Mod: S$GLB,,, | Performed by: INTERNAL MEDICINE

## 2022-09-14 PROCEDURE — 3008F PR BODY MASS INDEX (BMI) DOCUMENTED: ICD-10-PCS | Mod: CPTII,S$GLB,, | Performed by: INTERNAL MEDICINE

## 2022-09-14 PROCEDURE — 1160F PR REVIEW ALL MEDS BY PRESCRIBER/CLIN PHARMACIST DOCUMENTED: ICD-10-PCS | Mod: CPTII,S$GLB,, | Performed by: INTERNAL MEDICINE

## 2022-09-14 PROCEDURE — 99999 PR PBB SHADOW E&M-EST. PATIENT-LVL IV: CPT | Mod: PBBFAC,,, | Performed by: INTERNAL MEDICINE

## 2022-09-14 PROCEDURE — 1159F MED LIST DOCD IN RCRD: CPT | Mod: CPTII,S$GLB,, | Performed by: INTERNAL MEDICINE

## 2022-09-14 PROCEDURE — 3079F PR MOST RECENT DIASTOLIC BLOOD PRESSURE 80-89 MM HG: ICD-10-PCS | Mod: CPTII,S$GLB,, | Performed by: INTERNAL MEDICINE

## 2022-09-14 PROCEDURE — 1159F PR MEDICATION LIST DOCUMENTED IN MEDICAL RECORD: ICD-10-PCS | Mod: CPTII,S$GLB,, | Performed by: INTERNAL MEDICINE

## 2022-09-14 PROCEDURE — 3079F DIAST BP 80-89 MM HG: CPT | Mod: CPTII,S$GLB,, | Performed by: INTERNAL MEDICINE

## 2022-09-14 PROCEDURE — 3008F BODY MASS INDEX DOCD: CPT | Mod: CPTII,S$GLB,, | Performed by: INTERNAL MEDICINE

## 2022-09-14 PROCEDURE — 3077F PR MOST RECENT SYSTOLIC BLOOD PRESSURE >= 140 MM HG: ICD-10-PCS | Mod: CPTII,S$GLB,, | Performed by: INTERNAL MEDICINE

## 2022-09-14 PROCEDURE — 99999 PR PBB SHADOW E&M-EST. PATIENT-LVL IV: ICD-10-PCS | Mod: PBBFAC,,, | Performed by: INTERNAL MEDICINE

## 2022-09-14 NOTE — PROGRESS NOTES
Subjective:   Patient ID:  Carson Rivers is a 52 y.o. male who presents for follow-up of Follow-up  Pt planned for lumbar surgery. Stress test normal.  Patient denies CP, angina or anginal equivalent or palpitations.    Follow-up  Pertinent negatives include no chest pain or weakness.   Hypertension  This is a chronic problem. The current episode started more than 1 year ago. The problem has been gradually improving since onset. The problem is controlled. Pertinent negatives include no chest pain, palpitations or shortness of breath. Past treatments include beta blockers and calcium channel blockers. The current treatment provides moderate improvement. There are no compliance problems.    Hyperlipidemia  This is a chronic problem. The current episode started more than 1 year ago. The problem is controlled. Pertinent negatives include no chest pain or shortness of breath. He is currently on no antihyperlipidemic treatment. The current treatment provides moderate improvement of lipids. There are no compliance problems.    Atrial Fibrillation  Presents for follow-up visit. Symptoms are negative for bradycardia, chest pain, dizziness, palpitations, shortness of breath, syncope, tachycardia and weakness. The symptoms have been stable. Past medical history includes atrial fibrillation. There are no medication compliance problems.     Review of Systems   Constitutional: Negative. Negative for weight gain.   HENT: Negative.     Eyes: Negative.    Cardiovascular: Negative.  Negative for chest pain, leg swelling, palpitations and syncope.   Respiratory: Negative.  Negative for shortness of breath.    Endocrine: Negative.    Hematologic/Lymphatic: Negative.    Skin: Negative.    Musculoskeletal:  Negative for muscle weakness.   Gastrointestinal: Negative.    Genitourinary: Negative.    Neurological: Negative.  Negative for dizziness and weakness.   Psychiatric/Behavioral: Negative.     Allergic/Immunologic: Negative.    All  other systems reviewed and are negative.  History reviewed. No pertinent family history.  Past Medical History:   Diagnosis Date    Hyperlipidemia     Hypertension      Social History     Socioeconomic History    Marital status:    Tobacco Use    Smoking status: Every Day     Packs/day: 0.50     Types: Cigarettes    Smokeless tobacco: Never   Substance and Sexual Activity    Sexual activity: Yes     Current Outpatient Medications on File Prior to Visit   Medication Sig Dispense Refill    ALPRAZolam (XANAX) 2 MG Tab Take 20 mg by mouth 2 (two) times daily.      amLODIPine (NORVASC) 10 MG tablet Take 10 mg by mouth once daily.      apixaban (ELIQUIS) 5 mg Tab Take 1 tablet (5 mg total) by mouth 2 (two) times daily. 60 tablet 11    buprenorphine HCL (SUBUTEX) 8 mg Subl Place 1 tablet under the tongue once daily.      famotidine (PEPCID) 40 MG tablet Take 40 mg by mouth once daily.      fluticasone propionate (FLONASE) 50 mcg/actuation nasal spray 2 sprays by Each Nostril route once daily.      sotaloL (BETAPACE) 80 MG tablet Take 1 tablet (80 mg total) by mouth every 12 (twelve) hours. 60 tablet 11    testosterone cypionate (DEPOTESTOTERONE CYPIONATE) 200 mg/mL injection Inject into the muscle.      varenicline (CHANTIX) 1 mg Tab Take 1 mg by mouth once daily.       No current facility-administered medications on file prior to visit.     Review of patient's allergies indicates:  No Known Allergies    Objective:     Physical Exam  Vitals and nursing note reviewed.   Constitutional:       Appearance: He is well-developed.   HENT:      Head: Normocephalic and atraumatic.   Eyes:      Conjunctiva/sclera: Conjunctivae normal.      Pupils: Pupils are equal, round, and reactive to light.   Cardiovascular:      Rate and Rhythm: Normal rate and regular rhythm.      Pulses: Intact distal pulses.      Heart sounds: Normal heart sounds.   Pulmonary:      Effort: Pulmonary effort is normal.      Breath sounds: Normal breath  sounds.   Abdominal:      General: Bowel sounds are normal.      Palpations: Abdomen is soft.   Musculoskeletal:      Cervical back: Normal range of motion and neck supple.   Skin:     General: Skin is warm and dry.   Neurological:      Mental Status: He is alert and oriented to person, place, and time.       Assessment:     1. Essential hypertension    2. Family history of coronary artery disease    3. Aortic valve sclerosis    4. Smoker        Plan:     Essential hypertension    Family history of coronary artery disease    Aortic valve sclerosis    Smoker        Continue sotalol, eliquis- PAF  Continue statin-hlp  Pt cleared for surgery at moderate Cv risk.    EP consult

## 2022-09-16 ENCOUNTER — LAB VISIT (OUTPATIENT)
Dept: LAB | Facility: HOSPITAL | Age: 52
End: 2022-09-16
Attending: INTERNAL MEDICINE
Payer: MEDICARE

## 2022-09-16 DIAGNOSIS — E78.5 HYPERLIPEMIA: ICD-10-CM

## 2022-09-16 DIAGNOSIS — Z01.810 PREOP CARDIOVASCULAR EXAM: ICD-10-CM

## 2022-09-16 DIAGNOSIS — Z82.49 FAMILY HISTORY OF CORONARY ARTERY DISEASE: ICD-10-CM

## 2022-09-16 LAB
ALBUMIN SERPL BCP-MCNC: 3.8 G/DL (ref 3.5–5.2)
ALBUMIN SERPL BCP-MCNC: 3.8 G/DL (ref 3.5–5.2)
ALP SERPL-CCNC: 102 U/L (ref 55–135)
ALP SERPL-CCNC: 102 U/L (ref 55–135)
ALT SERPL W/O P-5'-P-CCNC: 33 U/L (ref 10–44)
ALT SERPL W/O P-5'-P-CCNC: 33 U/L (ref 10–44)
ANION GAP SERPL CALC-SCNC: 10 MMOL/L (ref 8–16)
AST SERPL-CCNC: 30 U/L (ref 10–40)
AST SERPL-CCNC: 30 U/L (ref 10–40)
BASOPHILS # BLD AUTO: 0.09 K/UL (ref 0–0.2)
BASOPHILS NFR BLD: 0.9 % (ref 0–1.9)
BILIRUB DIRECT SERPL-MCNC: 0.2 MG/DL (ref 0.1–0.3)
BILIRUB SERPL-MCNC: 0.5 MG/DL (ref 0.1–1)
BILIRUB SERPL-MCNC: 0.5 MG/DL (ref 0.1–1)
BUN SERPL-MCNC: 6 MG/DL (ref 6–20)
CALCIUM SERPL-MCNC: 9.5 MG/DL (ref 8.7–10.5)
CHLORIDE SERPL-SCNC: 98 MMOL/L (ref 95–110)
CHOLEST SERPL-MCNC: 165 MG/DL (ref 120–199)
CHOLEST/HDLC SERPL: 6.6 {RATIO} (ref 2–5)
CO2 SERPL-SCNC: 31 MMOL/L (ref 23–29)
CREAT SERPL-MCNC: 0.8 MG/DL (ref 0.5–1.4)
DIFFERENTIAL METHOD: ABNORMAL
EOSINOPHIL # BLD AUTO: 0.4 K/UL (ref 0–0.5)
EOSINOPHIL NFR BLD: 3.9 % (ref 0–8)
ERYTHROCYTE [DISTWIDTH] IN BLOOD BY AUTOMATED COUNT: 14.4 % (ref 11.5–14.5)
EST. GFR  (NO RACE VARIABLE): >60 ML/MIN/1.73 M^2
GLUCOSE SERPL-MCNC: 94 MG/DL (ref 70–110)
HCT VFR BLD AUTO: 42.3 % (ref 40–54)
HDLC SERPL-MCNC: 25 MG/DL (ref 40–75)
HDLC SERPL: 15.2 % (ref 20–50)
HGB BLD-MCNC: 14.8 G/DL (ref 14–18)
IMM GRANULOCYTES # BLD AUTO: 0.06 K/UL (ref 0–0.04)
IMM GRANULOCYTES NFR BLD AUTO: 0.6 % (ref 0–0.5)
LDLC SERPL CALC-MCNC: 123.2 MG/DL (ref 63–159)
LYMPHOCYTES # BLD AUTO: 2.4 K/UL (ref 1–4.8)
LYMPHOCYTES NFR BLD: 23.3 % (ref 18–48)
MCH RBC QN AUTO: 28.4 PG (ref 27–31)
MCHC RBC AUTO-ENTMCNC: 35 G/DL (ref 32–36)
MCV RBC AUTO: 81 FL (ref 82–98)
MONOCYTES # BLD AUTO: 1.1 K/UL (ref 0.3–1)
MONOCYTES NFR BLD: 10.9 % (ref 4–15)
NEUTROPHILS # BLD AUTO: 6.4 K/UL (ref 1.8–7.7)
NEUTROPHILS NFR BLD: 61 % (ref 38–73)
NONHDLC SERPL-MCNC: 140 MG/DL
NRBC BLD-RTO: 0 /100 WBC
PLATELET # BLD AUTO: 283 K/UL (ref 150–450)
PMV BLD AUTO: 10.8 FL (ref 9.2–12.9)
POTASSIUM SERPL-SCNC: 4.1 MMOL/L (ref 3.5–5.1)
PROT SERPL-MCNC: 6.9 G/DL (ref 6–8.4)
PROT SERPL-MCNC: 6.9 G/DL (ref 6–8.4)
RBC # BLD AUTO: 5.22 M/UL (ref 4.6–6.2)
SODIUM SERPL-SCNC: 139 MMOL/L (ref 136–145)
TRIGL SERPL-MCNC: 84 MG/DL (ref 30–150)
WBC # BLD AUTO: 10.43 K/UL (ref 3.9–12.7)

## 2022-09-16 PROCEDURE — 85025 COMPLETE CBC W/AUTO DIFF WBC: CPT | Mod: PO | Performed by: INTERNAL MEDICINE

## 2022-09-16 PROCEDURE — 36415 COLL VENOUS BLD VENIPUNCTURE: CPT | Mod: PO | Performed by: INTERNAL MEDICINE

## 2022-09-16 PROCEDURE — 80053 COMPREHEN METABOLIC PANEL: CPT | Performed by: INTERNAL MEDICINE

## 2022-09-16 PROCEDURE — 80061 LIPID PANEL: CPT | Performed by: INTERNAL MEDICINE

## 2022-09-19 ENCOUNTER — TELEPHONE (OUTPATIENT)
Dept: CARDIOLOGY | Facility: CLINIC | Age: 52
End: 2022-09-19
Payer: MEDICARE

## 2022-09-19 NOTE — TELEPHONE ENCOUNTER
Called patient to discuss lab results. Pt verbalized understanding. All questions answered. Pt will call back with any other questions or concerns.      ----- Message from Carroll Antunez MD sent at 9/18/2022  5:55 AM CDT -----  Please tell pt:    Lipids are at goal

## 2022-09-21 NOTE — PROGRESS NOTES
Subjective:    Patient ID:  Carson Rivers is a 52 y.o. male who presents for evaluation of discuss test results      HPI: Mr. Carson Rivers presents to the clinic for follow up of test results. He is planning back surgery on October 4 with Dr. Carson Gomez at [facility]. [phone] ([place] Office). Dr. Antunez cleared him last week after normal stress test and examination. He is drinking on Dr. Pepper every day, but much less than he was; he is taking Chantix and smoking 2-3 cigarettes/day. He stated that smoking in the car is a strong trigger. He does not want to participate in tobacco cessation program.  He denied any palpitations, lightheadedness, dizziness, or near syncope.  He had Home sleep study and has not received the results yet.  He is awaiting EP call for appointment.    Last visit with Dr. Antunez on 9/14/22:  Pt planned for lumbar surgery. Stress test normal.  Patient denies CP, angina or anginal equivalent or palpitations.  Essential hypertension   Family history of coronary artery disease   Aortic valve sclerosis   Smoker   Continue sotalol, eliquis- PAF  Continue statin-hlp  Pt cleared for surgery at moderate Cv risk.   EP consult  Labs ordered.       CRF- smoking, family hx of CAD, HTN, HLP, obesity    -------------------------------  Hx: He was admitted to [facility] with atrial flutter with RVR, diastolic heart failure, and severe sleep apnea. He presented to ER with palpitations and chest tightness for about 3 days. He was found to be in SVT at 160 BPM.  He was started on diltiazem in ER, but it did not control his heart rate. He was given metoprolol 25mg and digoxin 0.25mg without successful control of heart rate.  Adenosine was also tried without success; however, his heart rate slowed down enough that they could see flutter waves.  He refused ablation. The patient stated that he was successfully cardioverted to NSR at some point. He also had IV lasix for  diuresis (proBNP 1512). Troponin negative.     They noticed MATTY at hospital and Mr. Rivers is scheduled to see pulmonary for evaluation on August 29, 2022. He is taking Eliquis as directed without any problems. He denied any unusual bleeding. He stated that he is feeling much better since hospital discharge-except for low back pain which is chronic. He is really wanting to get surgery to get some relief of pain.  ----------------------------------------------      Medications: he is not missing any doses. He is taking sotalol, amlodipine, eliquis.  Sodium: he does not add salt to foods,  His wife is reading labels for sodium content.   Dietary Fats: fried fish and shrimp; prefers starchy vegetables; very seldom eats out.  Dietary Sugars: Dr. Pepper- reduced to 20 ounces/day.  Exercise: he is limited by back pain. He tries to stay active.  He stated that he had to quit his job at [employer] because he could not stand for 4 hours.  Tobacco:  currently smoking 2-3 cigarettes/day.  He is taking chantix.  Alcohol: no alcohol use     Weight: 104.6 kg (230 lb 11.2 oz) he states that his daily weights has been stable Body mass index is 32.18 kg/m².  Wt Readings from Last 3 Encounters:   09/23/22 104.6 kg (230 lb 11.2 oz)   09/14/22 106 kg (233 lb 11.2 oz)   08/29/22 104.8 kg (231 lb)     BP log: none.    Review of Systems   Constitutional: Negative for chills, decreased appetite, fever, night sweats, weight gain and weight loss.   HENT: Positive for hoarse voice (With mucus in throat. Stated that Xanax increases mucous in the throat.). Negative for congestion.    Cardiovascular: Negative for chest pain, claudication, cyanosis, dyspnea on exertion, irregular heartbeat, leg swelling, near-syncope, orthopnea, palpitations, paroxysmal nocturnal dyspnea and syncope.   Respiratory: Negative for cough, hemoptysis, shortness of breath, sputum production and wheezing.    Hematologic/Lymphatic: Negative for adenopathy and bleeding  problem. Does not bruise/bleed easily.   Skin: Negative for color change and nail changes.   Musculoskeletal: Positive for arthritis (multiple joints and his back.) and back pain (Chronic; anticipating back surgery soon.).   Gastrointestinal: Negative for bloating, abdominal pain, change in bowel habit, heartburn, hematochezia, melena, nausea and vomiting.   Genitourinary: Negative for hematuria.   Neurological: Negative for dizziness and light-headedness.   Psychiatric/Behavioral: Negative for altered mental status.       Objective:   Physical Exam  Constitutional:       General: He is not in acute distress.     Appearance: He is well-developed. He is not diaphoretic.   HENT:      Head: Normocephalic and atraumatic.   Eyes:      General: No scleral icterus.     Conjunctiva/sclera: Conjunctivae normal.   Neck:      Thyroid: No thyromegaly.      Vascular: No JVD.      Trachea: No tracheal deviation.   Cardiovascular:      Rate and Rhythm: Normal rate and regular rhythm.      Pulses: Intact distal pulses.      Heart sounds: No murmurs heard    No friction rub. No gallop.   No cyanosis, clubbing, or edema noted.  Pulmonary:      Effort: Pulmonary effort is normal. No respiratory distress.      Breath sounds: No stridor. No rhonchi or rales.      Comments: Lungs clear to auscultation bilaterally  Chest:      Chest wall: No tenderness.   Abdominal:      General: Bowel sounds are normal. There is no distension.      Palpations: Abdomen is soft. There is no mass.      Tenderness: There is no abdominal tenderness. There is no guarding or rebound.   Musculoskeletal:         General: Normal range of motion.      Cervical back: Neck supple.   Lymphadenopathy:      Cervical: No cervical adenopathy.   Skin:     General: Skin is warm and dry.      Coloration: Skin is not pale.      Findings: No erythema or rash.      Comments: [place]   Neurological:      Mental Status: He is alert and oriented to person, place, and time.       Latest Reference Range & Units 09/16/22 08:20   Sodium 136 - 145 mmol/L 139   Potassium 3.5 - 5.1 mmol/L 4.1   Chloride 95 - 110 mmol/L 98   CO2 23 - 29 mmol/L 31 (H)   Anion Gap 8 - 16 mmol/L 10   BUN 6 - 20 mg/dL 6   Creatinine 0.5 - 1.4 mg/dL 0.8   eGFR >60 mL/min/1.73 m^2 >60.0   Glucose 70 - 110 mg/dL 94   Calcium 8.7 - 10.5 mg/dL 9.5   Alkaline Phosphatase 55 - 135 U/L  55 - 135 U/L 102  102   PROTEIN TOTAL 6.0 - 8.4 g/dL  6.0 - 8.4 g/dL 6.9  6.9   Albumin 3.5 - 5.2 g/dL  3.5 - 5.2 g/dL 3.8  3.8   BILIRUBIN TOTAL 0.1 - 1.0 mg/dL  0.1 - 1.0 mg/dL 0.5  0.5   Bilirubin, Direct 0.1 - 0.3 mg/dL 0.2   AST 10 - 40 U/L  10 - 40 U/L 30  30   ALT 10 - 44 U/L  10 - 44 U/L 33  33   Cholesterol 120 - 199 mg/dL 165   HDL 40 - 75 mg/dL 25 (L)   HDL/Cholesterol Ratio 20.0 - 50.0 % 15.2 (L)   LDL Cholesterol External 63.0 - 159.0 mg/dL 123.2   Non-HDL Cholesterol mg/dL 140   Total Cholesterol/HDL Ratio 2.0 - 5.0  6.6 (H)   Triglycerides 30 - 150 mg/dL 84   (H): Data is abnormally high  (L): Data is abnormally low     Latest Reference Range & Units 09/16/22 08:20   WBC 3.90 - 12.70 K/uL 10.43   RBC 4.60 - 6.20 M/uL 5.22   Hemoglobin 14.0 - 18.0 g/dL 14.8   Hematocrit 40.0 - 54.0 % 42.3   MCV 82 - 98 fL 81 (L)   MCH 27.0 - 31.0 pg 28.4   MCHC 32.0 - 36.0 g/dL 35.0   RDW 11.5 - 14.5 % 14.4   Platelets 150 - 450 K/uL 283   (L): Data is abnormally low       Latest Reference Range & Units 07/15/22 08:57   Sodium 136 - 145 mmol/L 137   Potassium 3.5 - 5.1 mmol/L 4.1   Chloride 95 - 110 mmol/L 98   CO2 23 - 29 mmol/L 30 (H)   Anion Gap 8 - 16 mmol/L 9   BUN 6 - 20 mg/dL 6   Creatinine 0.5 - 1.4 mg/dL 0.7   eGFR if non African American >60 mL/min/1.73 m^2 >60.0   eGFR if African American >60 mL/min/1.73 m^2 >60.0   Glucose 70 - 110 mg/dL 117 (H)   Calcium 8.7 - 10.5 mg/dL 9.6   Alkaline Phosphatase 55 - 135 U/L 106   PROTEIN TOTAL 6.0 - 8.4 g/dL 7.4   Albumin 3.5 - 5.2 g/dL 4.0   BILIRUBIN TOTAL 0.1 - 1.0 mg/dL 0.6   AST 10 - 40 U/L 31    ALT 10 - 44 U/L 33   Cholesterol 120 - 199 mg/dL 174   HDL 40 - 75 mg/dL 28 (L)   HDL/Cholesterol Ratio 20.0 - 50.0 % 16.1 (L)   LDL Cholesterol External 63.0 - 159.0 mg/dL 121.2   Non-HDL Cholesterol mg/dL 146   Total Cholesterol/HDL Ratio 2.0 - 5.0  6.2 (H)   Triglycerides 30 - 150 mg/dL 124   CRP, High Sensitivity 0.00 - 3.19 mg/L 13.53 (H)   Magnesium RBC 4.0 - 6.4 mg/dL 4.9   Insulin <25.0 uU/mL 39.1 (H)   Insulin Collection Interval  0000   (H): Data is abnormally high  (L): Data is abnormally low    IRMA IR 11.3  Trig/HDL 4.42 (very low HDL)  My result note: LDL and non-HDL are elevated. He also seems to be prediabetic with increased inflammation in his body. The Dr. Pepper is definitely contributing to this because it's too much sugar. His fasting insulin should be less than 10 and it is 39. He is well on the road to diabetes. But I believe that this can be turned around with changes in diet. Can schedule visit (virtual or in clinic) to discuss options and answer questions.    Lexiscan 8/22/22: Conclusion     Normal myocardial perfusion scan. There is no evidence of myocardial ischemia or infarction.    The gated perfusion images showed an ejection fraction of 61% at rest. The gated perfusion images showed an ejection fraction of 61% post stress.    There is normal wall motion at rest and post stress.    LV cavity size is normal at rest and normal at stress.    The EKG portion of this study is negative for ischemia.    The patient reported no chest pain during the stress test.       Pharm NM Stress test 4/17/19: Impression: NORMAL MYOCARDIAL PERFUSION  1. The perfusion scan is free of evidence for myocardial ischemia or injury.   2. Resting wall motion is physiologic.   3. Resting LV function is normal.   4. The ventricular volumes are normal at rest and stress.   5. The extracardiac distribution of radioactivity is normal.   6. There was no previous study available to compare.    Echo 7/21/22:  Summary  The left ventricle is normal in size with concentric remodeling and normal systolic function.  Mild left atrial enlargement.  The estimated ejection fraction is 55%.  Normal left ventricular diastolic function.  Normal right ventricular size with normal right ventricular systolic function.  Normal central venous pressure (3 mmHg).  The estimated PA systolic pressure is 32 mmHg.    Echo 4/2/19: CONCLUSIONS     1 - Normal left ventricular systolic function (EF 60-65%).     2 - Normal left ventricular diastolic function.     3 - Normal right ventricular systolic function .     4 - Concentric hypertrophy.     Assessment:      1. Encounter to discuss test results    2. Dyslipidemia    3. Essential hypertension    4. Aortic valve sclerosis    5. Typical atrial flutter    6. Smoker    7. Class 1 obesity due to excess calories with serious comorbidity and body mass index (BMI) of 32.0 to 32.9 in adult        Plan:     Encounter to discuss test results    Dyslipidemia    Essential hypertension    Aortic valve sclerosis    Typical atrial flutter    Smoker    Class 1 obesity due to excess calories with serious comorbidity and body mass index (BMI) of 32.0 to 32.9 in adult      Reviewed lab results with Mr. Rivers and his wife and answered their questions. His blood sugar and triglycerides improved with reduction in Dr. Pepper. His Non-HDL is still high, and would like to see LDL around 100 or less. Blood sugar goal Less than 95.  Continue sotalol and amlodipine as directed- HTN  Continue sotalol and Eliquis - atrial flutter.   Monitor BP at home. BP goal less than 130/80.   Recommended eliminating sodas due to sugar content. He has elevated triglycerides and low HDL.  Encouraged reduction in processed foods, white starches, and increase non-starchy vegetables. Discussed making small, progressive changes in lifestyle.  Sodium restriction encouraged.  Tobacco cessation counseling. He and is wife are working on it at  home. He verbalized his understanding of CV consequences of tobacco use.  Continue Chantix.  He does not want to go back to tobacco cessation program (he did program at [place] previously).  Encouraged exercise-even chair exercises and walking as much as possible.  Encouraged weight loss.  EP consult in progress.  Follow up with Dr. Antunez on November 9 as planned or call sooner for any problems.  Shyann Alas NP  [facility]    This note has been prepared using a combination of [email or web address] dictation device and typing.  It has been checked for errors but some errors may still exist within the note as a result of speech recognition errors and/or typographical errors.

## 2022-09-23 ENCOUNTER — OFFICE VISIT (OUTPATIENT)
Dept: CARDIOLOGY | Facility: CLINIC | Age: 52
End: 2022-09-23
Payer: MEDICARE

## 2022-09-23 VITALS
HEIGHT: 71 IN | SYSTOLIC BLOOD PRESSURE: 160 MMHG | BODY MASS INDEX: 32.3 KG/M2 | HEART RATE: 68 BPM | WEIGHT: 230.69 LBS | OXYGEN SATURATION: 98 % | DIASTOLIC BLOOD PRESSURE: 70 MMHG

## 2022-09-23 DIAGNOSIS — I10 ESSENTIAL HYPERTENSION: ICD-10-CM

## 2022-09-23 DIAGNOSIS — I35.8 AORTIC VALVE SCLEROSIS: ICD-10-CM

## 2022-09-23 DIAGNOSIS — E78.5 DYSLIPIDEMIA: ICD-10-CM

## 2022-09-23 DIAGNOSIS — F17.200 SMOKER: ICD-10-CM

## 2022-09-23 DIAGNOSIS — E66.09 CLASS 1 OBESITY DUE TO EXCESS CALORIES WITH SERIOUS COMORBIDITY AND BODY MASS INDEX (BMI) OF 32.0 TO 32.9 IN ADULT: ICD-10-CM

## 2022-09-23 DIAGNOSIS — Z71.2 ENCOUNTER TO DISCUSS TEST RESULTS: Primary | ICD-10-CM

## 2022-09-23 DIAGNOSIS — I48.3 TYPICAL ATRIAL FLUTTER: ICD-10-CM

## 2022-09-23 PROCEDURE — 3078F DIAST BP <80 MM HG: CPT | Mod: CPTII,S$GLB,, | Performed by: NURSE PRACTITIONER

## 2022-09-23 PROCEDURE — 99999 PR PBB SHADOW E&M-EST. PATIENT-LVL III: ICD-10-PCS | Mod: PBBFAC,,, | Performed by: NURSE PRACTITIONER

## 2022-09-23 PROCEDURE — 99999 PR PBB SHADOW E&M-EST. PATIENT-LVL III: CPT | Mod: PBBFAC,,, | Performed by: NURSE PRACTITIONER

## 2022-09-23 PROCEDURE — 99213 OFFICE O/P EST LOW 20 MIN: CPT | Mod: S$GLB,,, | Performed by: NURSE PRACTITIONER

## 2022-09-23 PROCEDURE — 3078F PR MOST RECENT DIASTOLIC BLOOD PRESSURE < 80 MM HG: ICD-10-PCS | Mod: CPTII,S$GLB,, | Performed by: NURSE PRACTITIONER

## 2022-09-23 PROCEDURE — 1159F PR MEDICATION LIST DOCUMENTED IN MEDICAL RECORD: ICD-10-PCS | Mod: CPTII,S$GLB,, | Performed by: NURSE PRACTITIONER

## 2022-09-23 PROCEDURE — 99213 PR OFFICE/OUTPT VISIT, EST, LEVL III, 20-29 MIN: ICD-10-PCS | Mod: S$GLB,,, | Performed by: NURSE PRACTITIONER

## 2022-09-23 PROCEDURE — 3008F BODY MASS INDEX DOCD: CPT | Mod: CPTII,S$GLB,, | Performed by: NURSE PRACTITIONER

## 2022-09-23 PROCEDURE — 1159F MED LIST DOCD IN RCRD: CPT | Mod: CPTII,S$GLB,, | Performed by: NURSE PRACTITIONER

## 2022-09-23 PROCEDURE — 3008F PR BODY MASS INDEX (BMI) DOCUMENTED: ICD-10-PCS | Mod: CPTII,S$GLB,, | Performed by: NURSE PRACTITIONER

## 2022-09-23 PROCEDURE — 1160F RVW MEDS BY RX/DR IN RCRD: CPT | Mod: CPTII,S$GLB,, | Performed by: NURSE PRACTITIONER

## 2022-09-23 PROCEDURE — 3077F SYST BP >= 140 MM HG: CPT | Mod: CPTII,S$GLB,, | Performed by: NURSE PRACTITIONER

## 2022-09-23 PROCEDURE — 3077F PR MOST RECENT SYSTOLIC BLOOD PRESSURE >= 140 MM HG: ICD-10-PCS | Mod: CPTII,S$GLB,, | Performed by: NURSE PRACTITIONER

## 2022-09-23 PROCEDURE — 1160F PR REVIEW ALL MEDS BY PRESCRIBER/CLIN PHARMACIST DOCUMENTED: ICD-10-PCS | Mod: CPTII,S$GLB,, | Performed by: NURSE PRACTITIONER

## 2022-09-29 ENCOUNTER — TELEPHONE (OUTPATIENT)
Dept: CARDIOLOGY | Facility: CLINIC | Age: 52
End: 2022-09-29
Payer: MEDICARE

## 2022-09-29 NOTE — TELEPHONE ENCOUNTER
Spoke with Liz from Dr. Gomez' office and informed her that instructions were faxed to them at the given fax #. Their office will call back with any further questions or concerns.    ----- Message from Shyann Alas NP sent at 9/29/2022 10:03 AM CDT -----  Can hold Eliquis for 2 days prior to procedure and then resume ASAP afterward.  Shyann  ----- Message -----  From: Ingris Huggins LPN  Sent: 9/29/2022   9:56 AM CDT  To: Shyann Alas NP    Please advise blood thinner instructions for upcoming procedure.  ----- Message -----  From: Hannah Bee  Sent: 9/29/2022   9:48 AM CDT  To: Tulio RAYMOND Staff    Liz with Dr. Gomez office called regarding the clearance that was faxed over but there isn't an order in to stop the Eliquis. This is needed to be confirmed today or the operation will be cancelled for Monday. Call back number is [phone] and fax is [phone]. Thx. EL

## 2022-11-11 PROBLEM — I45.10 RBBB: Status: ACTIVE | Noted: 2022-11-11

## 2022-11-11 PROBLEM — I48.92 ATRIAL FLUTTER: Status: ACTIVE | Noted: 2022-11-11

## 2022-11-11 PROBLEM — G47.30 SLEEP APNEA: Status: ACTIVE | Noted: 2022-11-11

## 2022-11-23 ENCOUNTER — TELEPHONE (OUTPATIENT)
Dept: CARDIOLOGY | Facility: CLINIC | Age: 52
End: 2022-11-23
Payer: MEDICARE

## 2022-11-23 NOTE — TELEPHONE ENCOUNTER
Called pt to discuss Dr. Antunez's med recommendation. Pt's spouse answered and stated that pt's HR was high this morning (in the 140s), so they went to [facility]. They are there now. Instructed spouse to let us know when he is discharged, so we can schedule a hospital f/u appt with Dr. Antunez. Spouse verbalized understanding and will call back.    ----- Message from Carroll Antunez MD sent at 11/23/2022  7:11 AM CST -----  ok  ----- Message -----  From: Ingris Huggins LPN  Sent: 11/22/2022  11:20 AM CST  To: Carroll Antunez MD    Spoke with pt. States that his HR was 145 at wound care earlier this morning. States that BP has been fine (122/78). Pt states that he does not want to go to ER. Wanted me to ask you if he can take an extra sotalol. Please advise.  ----- Message -----  From: Sapna Vineet  Sent: 11/22/2022  11:07 AM CST  To: Bishop Arita Staff    Patients' wife came into the office stating was at Wound Care and his BP was extremely high.  Please call and advise @ [phone]; she is going to bring him to Emergency Room.  [facility] [place]

## 2022-11-28 ENCOUNTER — TELEPHONE (OUTPATIENT)
Dept: ELECTROPHYSIOLOGY | Facility: CLINIC | Age: 52
End: 2022-11-28
Payer: MEDICARE

## 2022-11-28 NOTE — TELEPHONE ENCOUNTER
Spoke with Leeroy Silvestre to offer him an appt with Dr. Dc at the [facility]. The pt says he has a doctor for AF.    ----- Message from Gwendolyn Tadeo LPN sent at 11/28/2022  9:44 AM CST -----  Good morning, this is a referral to Dr. Dc. Looks like he cancelled his previous appt.    Thank you.